# Patient Record
Sex: FEMALE | Race: WHITE | NOT HISPANIC OR LATINO
[De-identification: names, ages, dates, MRNs, and addresses within clinical notes are randomized per-mention and may not be internally consistent; named-entity substitution may affect disease eponyms.]

---

## 2017-03-19 PROBLEM — Z00.00 ENCOUNTER FOR PREVENTIVE HEALTH EXAMINATION: Noted: 2017-03-19

## 2017-03-23 ENCOUNTER — RECORD ABSTRACTING (OUTPATIENT)
Age: 77
End: 2017-03-23

## 2017-03-23 DIAGNOSIS — Z63.4 DISAPPEARANCE AND DEATH OF FAMILY MEMBER: ICD-10-CM

## 2017-03-23 DIAGNOSIS — Z92.89 PERSONAL HISTORY OF OTHER MEDICAL TREATMENT: ICD-10-CM

## 2017-03-23 DIAGNOSIS — Z87.891 PERSONAL HISTORY OF NICOTINE DEPENDENCE: ICD-10-CM

## 2017-03-23 DIAGNOSIS — E06.3 AUTOIMMUNE THYROIDITIS: ICD-10-CM

## 2017-03-23 DIAGNOSIS — Z78.9 OTHER SPECIFIED HEALTH STATUS: ICD-10-CM

## 2017-03-23 DIAGNOSIS — C44.311 BASAL CELL CARCINOMA OF SKIN OF NOSE: ICD-10-CM

## 2017-03-23 DIAGNOSIS — E55.9 VITAMIN D DEFICIENCY, UNSPECIFIED: ICD-10-CM

## 2017-03-23 DIAGNOSIS — Z80.42 FAMILY HISTORY OF MALIGNANT NEOPLASM OF PROSTATE: ICD-10-CM

## 2017-03-23 DIAGNOSIS — Z80.0 FAMILY HISTORY OF MALIGNANT NEOPLASM OF DIGESTIVE ORGANS: ICD-10-CM

## 2017-03-23 DIAGNOSIS — Z80.1 FAMILY HISTORY OF MALIGNANT NEOPLASM OF TRACHEA, BRONCHUS AND LUNG: ICD-10-CM

## 2017-03-23 SDOH — SOCIAL STABILITY - SOCIAL INSECURITY: DISSAPEARANCE AND DEATH OF FAMILY MEMBER: Z63.4

## 2017-04-10 ENCOUNTER — APPOINTMENT (OUTPATIENT)
Dept: INTERNAL MEDICINE | Facility: CLINIC | Age: 77
End: 2017-04-10

## 2017-04-11 ENCOUNTER — OTHER (OUTPATIENT)
Age: 77
End: 2017-04-11

## 2017-05-30 ENCOUNTER — OTHER (OUTPATIENT)
Age: 77
End: 2017-05-30

## 2017-05-30 LAB
CHOLEST SERPL-MCNC: 299
GLUCOSE SERPL-MCNC: 89
HBA1C MFR BLD HPLC: 5.3
HDLC SERPL-MCNC: 93
LDLC SERPL CALC-MCNC: 182

## 2017-06-05 ENCOUNTER — APPOINTMENT (OUTPATIENT)
Dept: INTERNAL MEDICINE | Facility: CLINIC | Age: 77
End: 2017-06-05

## 2017-06-08 ENCOUNTER — APPOINTMENT (OUTPATIENT)
Dept: INTERNAL MEDICINE | Facility: CLINIC | Age: 77
End: 2017-06-08

## 2017-06-08 ENCOUNTER — NON-APPOINTMENT (OUTPATIENT)
Age: 77
End: 2017-06-08

## 2017-06-08 VITALS
TEMPERATURE: 97.9 F | BODY MASS INDEX: 26.81 KG/M2 | WEIGHT: 142 LBS | SYSTOLIC BLOOD PRESSURE: 124 MMHG | HEIGHT: 61 IN | HEART RATE: 64 BPM | DIASTOLIC BLOOD PRESSURE: 60 MMHG | OXYGEN SATURATION: 97 % | RESPIRATION RATE: 16 BRPM

## 2017-06-08 DIAGNOSIS — I70.0 ATHEROSCLEROSIS OF AORTA: ICD-10-CM

## 2017-06-08 DIAGNOSIS — E03.9 HYPOTHYROIDISM, UNSPECIFIED: ICD-10-CM

## 2017-06-08 DIAGNOSIS — E78.00 PURE HYPERCHOLESTEROLEMIA, UNSPECIFIED: ICD-10-CM

## 2017-06-08 DIAGNOSIS — Z87.19 PERSONAL HISTORY OF OTHER DISEASES OF THE DIGESTIVE SYSTEM: ICD-10-CM

## 2017-06-08 DIAGNOSIS — H25.9 UNSPECIFIED AGE-RELATED CATARACT: ICD-10-CM

## 2017-06-08 DIAGNOSIS — Z87.891 PERSONAL HISTORY OF NICOTINE DEPENDENCE: ICD-10-CM

## 2017-06-08 DIAGNOSIS — I65.23 OCCLUSION AND STENOSIS OF BILATERAL CAROTID ARTERIES: ICD-10-CM

## 2017-06-08 DIAGNOSIS — Z85.828 PERSONAL HISTORY OF OTHER MALIGNANT NEOPLASM OF SKIN: ICD-10-CM

## 2017-06-08 DIAGNOSIS — R31.29 OTHER MICROSCOPIC HEMATURIA: ICD-10-CM

## 2017-06-08 RX ORDER — CEPHALEXIN 500 MG/1
500 CAPSULE ORAL
Qty: 20 | Refills: 0 | Status: COMPLETED | COMMUNITY
Start: 2017-02-03

## 2017-06-08 RX ORDER — UBIDECARENONE 200 MG
200 CAPSULE ORAL
Refills: 0 | Status: COMPLETED | COMMUNITY
End: 2017-06-08

## 2017-06-12 LAB
APPEARANCE: CLEAR
BACTERIA UR CULT: NORMAL
BACTERIA: NEGATIVE
BILIRUBIN URINE: NEGATIVE
BLOOD URINE: ABNORMAL
COLOR: YELLOW
GLUCOSE QUALITATIVE U: NORMAL MG/DL
HYALINE CASTS: 1 /LPF
KETONES URINE: NEGATIVE
LEUKOCYTE ESTERASE URINE: NEGATIVE
MICROSCOPIC-UA: NORMAL
NITRITE URINE: NEGATIVE
PH URINE: 7
PROTEIN URINE: NEGATIVE MG/DL
RED BLOOD CELLS URINE: 10 /HPF
SPECIFIC GRAVITY URINE: 1.02
SQUAMOUS EPITHELIAL CELLS: 0 /HPF
UROBILINOGEN URINE: NORMAL MG/DL
WHITE BLOOD CELLS URINE: 0 /HPF

## 2017-07-12 ENCOUNTER — APPOINTMENT (OUTPATIENT)
Dept: INTERNAL MEDICINE | Facility: CLINIC | Age: 77
End: 2017-07-12

## 2017-07-12 VITALS
SYSTOLIC BLOOD PRESSURE: 124 MMHG | OXYGEN SATURATION: 98 % | WEIGHT: 139 LBS | DIASTOLIC BLOOD PRESSURE: 76 MMHG | RESPIRATION RATE: 16 BRPM | HEIGHT: 61 IN | HEART RATE: 80 BPM | BODY MASS INDEX: 26.24 KG/M2

## 2017-07-14 LAB
APPEARANCE: CLEAR
BACTERIA UR CULT: NORMAL
BACTERIA: NEGATIVE
BILIRUBIN URINE: NEGATIVE
BLOOD URINE: ABNORMAL
COLOR: YELLOW
GLUCOSE QUALITATIVE U: NORMAL
KETONES URINE: NEGATIVE
LEUKOCYTE ESTERASE URINE: NEGATIVE
MICROSCOPIC-UA: NORMAL
NITRITE URINE: NEGATIVE
PH URINE: 6.5
PROTEIN URINE: NEGATIVE
RED BLOOD CELLS URINE: 4 /HPF
SPECIFIC GRAVITY URINE: 1.02
SQUAMOUS EPITHELIAL CELLS: 2 /HPF
UROBILINOGEN URINE: NORMAL
WHITE BLOOD CELLS URINE: 3 /HPF

## 2017-08-08 ENCOUNTER — APPOINTMENT (OUTPATIENT)
Dept: INTERNAL MEDICINE | Facility: CLINIC | Age: 77
End: 2017-08-08

## 2017-10-31 ENCOUNTER — APPOINTMENT (OUTPATIENT)
Dept: DERMATOLOGY | Facility: CLINIC | Age: 77
End: 2017-10-31
Payer: MEDICARE

## 2017-10-31 DIAGNOSIS — L60.3 NAIL DYSTROPHY: ICD-10-CM

## 2017-10-31 PROCEDURE — 99203 OFFICE O/P NEW LOW 30 MIN: CPT

## 2017-11-20 ENCOUNTER — RX RENEWAL (OUTPATIENT)
Age: 77
End: 2017-11-20

## 2018-01-03 ENCOUNTER — APPOINTMENT (OUTPATIENT)
Dept: DERMATOLOGY | Facility: CLINIC | Age: 78
End: 2018-01-03

## 2018-01-10 ENCOUNTER — OUTPATIENT (OUTPATIENT)
Dept: OUTPATIENT SERVICES | Facility: HOSPITAL | Age: 78
LOS: 1 days | Discharge: HOME | End: 2018-01-10
Payer: MEDICARE

## 2018-01-10 VITALS
DIASTOLIC BLOOD PRESSURE: 79 MMHG | WEIGHT: 145.06 LBS | RESPIRATION RATE: 14 BRPM | HEIGHT: 60.5 IN | OXYGEN SATURATION: 100 % | TEMPERATURE: 97 F | HEART RATE: 67 BPM | SYSTOLIC BLOOD PRESSURE: 117 MMHG

## 2018-01-10 DIAGNOSIS — Z98.42 CATARACT EXTRACTION STATUS, LEFT EYE: ICD-10-CM

## 2018-01-10 DIAGNOSIS — Z96.651 PRESENCE OF RIGHT ARTIFICIAL KNEE JOINT: Chronic | ICD-10-CM

## 2018-01-10 DIAGNOSIS — E03.9 HYPOTHYROIDISM, UNSPECIFIED: ICD-10-CM

## 2018-01-10 DIAGNOSIS — Z98.890 OTHER SPECIFIED POSTPROCEDURAL STATES: Chronic | ICD-10-CM

## 2018-01-10 DIAGNOSIS — Z01.818 ENCOUNTER FOR OTHER PREPROCEDURAL EXAMINATION: ICD-10-CM

## 2018-01-10 DIAGNOSIS — M17.12 UNILATERAL PRIMARY OSTEOARTHRITIS, LEFT KNEE: ICD-10-CM

## 2018-01-10 DIAGNOSIS — Z87.891 PERSONAL HISTORY OF NICOTINE DEPENDENCE: ICD-10-CM

## 2018-01-10 DIAGNOSIS — Z90.89 ACQUIRED ABSENCE OF OTHER ORGANS: Chronic | ICD-10-CM

## 2018-01-10 DIAGNOSIS — D72.829 ELEVATED WHITE BLOOD CELL COUNT, UNSPECIFIED: ICD-10-CM

## 2018-01-10 DIAGNOSIS — Z98.49 CATARACT EXTRACTION STATUS, UNSPECIFIED EYE: Chronic | ICD-10-CM

## 2018-01-10 DIAGNOSIS — D62 ACUTE POSTHEMORRHAGIC ANEMIA: ICD-10-CM

## 2018-01-10 DIAGNOSIS — Z96.651 PRESENCE OF RIGHT ARTIFICIAL KNEE JOINT: ICD-10-CM

## 2018-01-10 LAB
ANION GAP SERPL CALC-SCNC: 9 MMOL/L — SIGNIFICANT CHANGE UP (ref 5–17)
APPEARANCE UR: CLEAR — SIGNIFICANT CHANGE UP
BASOPHILS # BLD AUTO: 0.1 K/UL — SIGNIFICANT CHANGE UP (ref 0–0.2)
BASOPHILS NFR BLD AUTO: 1.2 % — SIGNIFICANT CHANGE UP (ref 0–2)
BILIRUB UR-MCNC: NEGATIVE — SIGNIFICANT CHANGE UP
BUN SERPL-MCNC: 21 MG/DL — SIGNIFICANT CHANGE UP (ref 7–23)
CALCIUM SERPL-MCNC: 9.1 MG/DL — SIGNIFICANT CHANGE UP (ref 8.5–10.1)
CHLORIDE SERPL-SCNC: 104 MMOL/L — SIGNIFICANT CHANGE UP (ref 96–108)
CO2 SERPL-SCNC: 28 MMOL/L — SIGNIFICANT CHANGE UP (ref 22–31)
COLOR SPEC: YELLOW — SIGNIFICANT CHANGE UP
CREAT SERPL-MCNC: 0.55 MG/DL — SIGNIFICANT CHANGE UP (ref 0.5–1.3)
DIFF PNL FLD: (no result)
EOSINOPHIL # BLD AUTO: 0.2 K/UL — SIGNIFICANT CHANGE UP (ref 0–0.5)
EOSINOPHIL NFR BLD AUTO: 2.1 % — SIGNIFICANT CHANGE UP (ref 0–6)
GLUCOSE SERPL-MCNC: 79 MG/DL — SIGNIFICANT CHANGE UP (ref 70–99)
GLUCOSE UR QL: NEGATIVE MG/DL — SIGNIFICANT CHANGE UP
HCT VFR BLD CALC: 39.7 % — SIGNIFICANT CHANGE UP (ref 34.5–45)
HGB BLD-MCNC: 13.2 G/DL — SIGNIFICANT CHANGE UP (ref 11.5–15.5)
KETONES UR-MCNC: NEGATIVE — SIGNIFICANT CHANGE UP
LEUKOCYTE ESTERASE UR-ACNC: (no result)
LYMPHOCYTES # BLD AUTO: 1.8 K/UL — SIGNIFICANT CHANGE UP (ref 1–3.3)
LYMPHOCYTES # BLD AUTO: 24.9 % — SIGNIFICANT CHANGE UP (ref 13–44)
MCHC RBC-ENTMCNC: 29.2 PG — SIGNIFICANT CHANGE UP (ref 27–34)
MCHC RBC-ENTMCNC: 33.4 GM/DL — SIGNIFICANT CHANGE UP (ref 32–36)
MCV RBC AUTO: 87.6 FL — SIGNIFICANT CHANGE UP (ref 80–100)
MONOCYTES # BLD AUTO: 0.6 K/UL — SIGNIFICANT CHANGE UP (ref 0–0.9)
MONOCYTES NFR BLD AUTO: 7.7 % — SIGNIFICANT CHANGE UP (ref 2–14)
NEUTROPHILS # BLD AUTO: 4.7 K/UL — SIGNIFICANT CHANGE UP (ref 1.8–7.4)
NEUTROPHILS NFR BLD AUTO: 64.1 % — SIGNIFICANT CHANGE UP (ref 43–77)
NITRITE UR-MCNC: NEGATIVE — SIGNIFICANT CHANGE UP
PH UR: 8 — SIGNIFICANT CHANGE UP (ref 5–8)
PLATELET # BLD AUTO: 313 K/UL — SIGNIFICANT CHANGE UP (ref 150–400)
POTASSIUM SERPL-MCNC: 4.3 MMOL/L — SIGNIFICANT CHANGE UP (ref 3.5–5.3)
POTASSIUM SERPL-SCNC: 4.3 MMOL/L — SIGNIFICANT CHANGE UP (ref 3.5–5.3)
PROT UR-MCNC: NEGATIVE MG/DL — SIGNIFICANT CHANGE UP
RBC # BLD: 4.53 M/UL — SIGNIFICANT CHANGE UP (ref 3.8–5.2)
RBC # FLD: 11.8 % — SIGNIFICANT CHANGE UP (ref 10.3–14.5)
SODIUM SERPL-SCNC: 141 MMOL/L — SIGNIFICANT CHANGE UP (ref 135–145)
SP GR SPEC: 1.01 — SIGNIFICANT CHANGE UP (ref 1.01–1.02)
TYPE + AB SCN PNL BLD: SIGNIFICANT CHANGE UP
UROBILINOGEN FLD QL: NEGATIVE MG/DL — SIGNIFICANT CHANGE UP
WBC # BLD: 7.3 K/UL — SIGNIFICANT CHANGE UP (ref 3.8–10.5)
WBC # FLD AUTO: 7.3 K/UL — SIGNIFICANT CHANGE UP (ref 3.8–10.5)

## 2018-01-10 PROCEDURE — 93010 ELECTROCARDIOGRAM REPORT: CPT

## 2018-01-10 NOTE — H&P PST ADULT - PSH
History of total knee replacement, right    S/P cataract surgery  b/l  S/P colonoscopy with polypectomy    S/P tonsillectomy

## 2018-01-10 NOTE — H&P PST ADULT - FAMILY HISTORY
Father  Still living? No  Family history of lung cancer, Age at diagnosis: Age Unknown     Mother  Still living? No  Family history of diabetes mellitus, Age at diagnosis: Age Unknown  Family history of pancreatic cancer, Age at diagnosis: Age Unknown     Sibling  Still living? Yes, Estimated age: 61-70  Family history of prostate cancer, Age at diagnosis: Age Unknown

## 2018-01-10 NOTE — H&P PST ADULT - PMH
Basal cell carcinoma (BCC) in situ of skin  h/o on nose  Hypothyroid    Knee pain, left    Osteoarthritis of knee    Pneumonia Basal cell carcinoma (BCC) in situ of skin  h/o on nose  Hypothyroid    Knee pain, left    Osteoarthritis of knee    Pneumonia  2010?

## 2018-01-10 NOTE — H&P PST ADULT - ASSESSMENT
78 yo female scheduled for left TKR with Dr. De Anda on 1/19/18    1. Labs as per surgeon  2. EKG  3. Medical clearance with PCP Dr Abbott  4. discussed EZ sponges, mupirocin & day of procedure instructions  5. Stat PTT, PT/INR on admit  Caprini score 11 76 yo female scheduled for left TKR with Dr. De Anda on 1/19/18    1. Labs as per surgeon  2. EKG  3. Medical clearance with PCP Dr Abbott  4. discussed EZ sponges, mupirocin & day of procedure instructions  5. Stat PTT, PT/INR on admit  6. Plans on attending joint education class today  Leei score 11

## 2018-01-10 NOTE — H&P PST ADULT - HISTORY OF PRESENT ILLNESS
78 yo female presents to PST prior to proposed procedure. c/o left knee pain x "years" that increases with sitting>standing. Had right TKR in 2016 with Dr De Anda. Had MRI & xrays of left knee. Reports taking advil occasionally prn.  Now for said procedure.

## 2018-01-11 ENCOUNTER — OTHER (OUTPATIENT)
Age: 78
End: 2018-01-11

## 2018-01-11 LAB
MRSA PCR RESULT.: SIGNIFICANT CHANGE UP
S AUREUS DNA NOSE QL NAA+PROBE: SIGNIFICANT CHANGE UP

## 2018-01-17 ENCOUNTER — LABORATORY RESULT (OUTPATIENT)
Age: 78
End: 2018-01-17

## 2018-01-17 ENCOUNTER — APPOINTMENT (OUTPATIENT)
Dept: INTERNAL MEDICINE | Facility: CLINIC | Age: 78
End: 2018-01-17
Payer: MEDICARE

## 2018-01-17 VITALS
SYSTOLIC BLOOD PRESSURE: 122 MMHG | HEIGHT: 61 IN | WEIGHT: 145 LBS | DIASTOLIC BLOOD PRESSURE: 80 MMHG | HEART RATE: 70 BPM | TEMPERATURE: 98.3 F | BODY MASS INDEX: 27.38 KG/M2 | OXYGEN SATURATION: 96 % | RESPIRATION RATE: 16 BRPM

## 2018-01-17 PROCEDURE — 99214 OFFICE O/P EST MOD 30 MIN: CPT | Mod: 25

## 2018-01-17 PROCEDURE — 94010 BREATHING CAPACITY TEST: CPT

## 2018-01-18 RX ORDER — FAMOTIDINE 10 MG/ML
20 INJECTION INTRAVENOUS ONCE
Qty: 0 | Refills: 0 | Status: COMPLETED | OUTPATIENT
Start: 2018-01-19 | End: 2018-01-19

## 2018-01-18 RX ORDER — PANTOPRAZOLE SODIUM 20 MG/1
40 TABLET, DELAYED RELEASE ORAL ONCE
Qty: 0 | Refills: 0 | Status: COMPLETED | OUTPATIENT
Start: 2018-01-19 | End: 2018-01-19

## 2018-01-18 RX ORDER — OXYCODONE HYDROCHLORIDE 5 MG/1
10 TABLET ORAL ONCE
Qty: 0 | Refills: 0 | Status: DISCONTINUED | OUTPATIENT
Start: 2018-01-19 | End: 2018-01-19

## 2018-01-18 RX ORDER — ACETAMINOPHEN 500 MG
975 TABLET ORAL ONCE
Qty: 0 | Refills: 0 | Status: COMPLETED | OUTPATIENT
Start: 2018-01-19 | End: 2018-01-19

## 2018-01-19 ENCOUNTER — INPATIENT (INPATIENT)
Facility: HOSPITAL | Age: 78
LOS: 2 days | Discharge: NURSING, REHAB OR LONG-TERM CARE | End: 2018-01-22
Attending: ORTHOPAEDIC SURGERY | Admitting: ORTHOPAEDIC SURGERY
Payer: MEDICARE

## 2018-01-19 ENCOUNTER — RESULT REVIEW (OUTPATIENT)
Age: 78
End: 2018-01-19

## 2018-01-19 VITALS
TEMPERATURE: 98 F | RESPIRATION RATE: 16 BRPM | HEIGHT: 60.5 IN | OXYGEN SATURATION: 100 % | DIASTOLIC BLOOD PRESSURE: 73 MMHG | SYSTOLIC BLOOD PRESSURE: 150 MMHG | HEART RATE: 77 BPM | WEIGHT: 145.28 LBS

## 2018-01-19 DIAGNOSIS — Z90.89 ACQUIRED ABSENCE OF OTHER ORGANS: Chronic | ICD-10-CM

## 2018-01-19 DIAGNOSIS — Z98.890 OTHER SPECIFIED POSTPROCEDURAL STATES: Chronic | ICD-10-CM

## 2018-01-19 DIAGNOSIS — E03.9 HYPOTHYROIDISM, UNSPECIFIED: ICD-10-CM

## 2018-01-19 DIAGNOSIS — Z98.49 CATARACT EXTRACTION STATUS, UNSPECIFIED EYE: Chronic | ICD-10-CM

## 2018-01-19 DIAGNOSIS — Z96.651 PRESENCE OF RIGHT ARTIFICIAL KNEE JOINT: Chronic | ICD-10-CM

## 2018-01-19 DIAGNOSIS — Z98.890 OTHER SPECIFIED POSTPROCEDURAL STATES: ICD-10-CM

## 2018-01-19 LAB
ANION GAP SERPL CALC-SCNC: 7 MMOL/L — SIGNIFICANT CHANGE UP (ref 5–17)
APTT BLD: 30.3 SEC — SIGNIFICANT CHANGE UP (ref 27.5–37.4)
BUN SERPL-MCNC: 17 MG/DL — SIGNIFICANT CHANGE UP (ref 7–23)
CALCIUM SERPL-MCNC: 8.3 MG/DL — LOW (ref 8.5–10.1)
CHLORIDE SERPL-SCNC: 108 MMOL/L — SIGNIFICANT CHANGE UP (ref 96–108)
CO2 SERPL-SCNC: 27 MMOL/L — SIGNIFICANT CHANGE UP (ref 22–31)
CREAT SERPL-MCNC: 0.68 MG/DL — SIGNIFICANT CHANGE UP (ref 0.5–1.3)
GLUCOSE SERPL-MCNC: 87 MG/DL — SIGNIFICANT CHANGE UP (ref 70–99)
HCT VFR BLD CALC: 36.1 % — SIGNIFICANT CHANGE UP (ref 34.5–45)
HGB BLD-MCNC: 12.1 G/DL — SIGNIFICANT CHANGE UP (ref 11.5–15.5)
INR BLD: 1.01 RATIO — SIGNIFICANT CHANGE UP (ref 0.88–1.16)
INR BLD: 1.06 RATIO — SIGNIFICANT CHANGE UP (ref 0.88–1.16)
MCHC RBC-ENTMCNC: 29.3 PG — SIGNIFICANT CHANGE UP (ref 27–34)
MCHC RBC-ENTMCNC: 33.5 GM/DL — SIGNIFICANT CHANGE UP (ref 32–36)
MCV RBC AUTO: 87.3 FL — SIGNIFICANT CHANGE UP (ref 80–100)
PLATELET # BLD AUTO: 251 K/UL — SIGNIFICANT CHANGE UP (ref 150–400)
POTASSIUM SERPL-MCNC: 4 MMOL/L — SIGNIFICANT CHANGE UP (ref 3.5–5.3)
POTASSIUM SERPL-SCNC: 4 MMOL/L — SIGNIFICANT CHANGE UP (ref 3.5–5.3)
PROTHROM AB SERPL-ACNC: 10.9 SEC — SIGNIFICANT CHANGE UP (ref 9.8–12.7)
PROTHROM AB SERPL-ACNC: 11.5 SEC — SIGNIFICANT CHANGE UP (ref 9.8–12.7)
RBC # BLD: 4.14 M/UL — SIGNIFICANT CHANGE UP (ref 3.8–5.2)
RBC # FLD: 11.8 % — SIGNIFICANT CHANGE UP (ref 10.3–14.5)
SODIUM SERPL-SCNC: 142 MMOL/L — SIGNIFICANT CHANGE UP (ref 135–145)
WBC # BLD: 7 K/UL — SIGNIFICANT CHANGE UP (ref 3.8–10.5)
WBC # FLD AUTO: 7 K/UL — SIGNIFICANT CHANGE UP (ref 3.8–10.5)

## 2018-01-19 PROCEDURE — 73560 X-RAY EXAM OF KNEE 1 OR 2: CPT | Mod: 26,LT

## 2018-01-19 PROCEDURE — 99223 1ST HOSP IP/OBS HIGH 75: CPT

## 2018-01-19 PROCEDURE — 88305 TISSUE EXAM BY PATHOLOGIST: CPT | Mod: 26

## 2018-01-19 RX ORDER — RIVAROXABAN 15 MG-20MG
1 KIT ORAL
Qty: 0 | Refills: 0 | COMMUNITY
Start: 2018-01-19 | End: 2018-01-30

## 2018-01-19 RX ORDER — CELECOXIB 200 MG/1
200 CAPSULE ORAL ONCE
Qty: 0 | Refills: 0 | Status: COMPLETED | OUTPATIENT
Start: 2018-01-19 | End: 2018-01-19

## 2018-01-19 RX ORDER — MAGNESIUM HYDROXIDE 400 MG/1
30 TABLET, CHEWABLE ORAL DAILY
Qty: 0 | Refills: 0 | Status: DISCONTINUED | OUTPATIENT
Start: 2018-01-19 | End: 2018-01-22

## 2018-01-19 RX ORDER — PROCHLORPERAZINE MALEATE 5 MG
10 TABLET ORAL EVERY 6 HOURS
Qty: 0 | Refills: 0 | Status: DISCONTINUED | OUTPATIENT
Start: 2018-01-19 | End: 2018-01-22

## 2018-01-19 RX ORDER — DOCUSATE SODIUM 100 MG
100 CAPSULE ORAL EVERY 12 HOURS
Qty: 0 | Refills: 0 | Status: DISCONTINUED | OUTPATIENT
Start: 2018-01-19 | End: 2018-01-22

## 2018-01-19 RX ORDER — GABAPENTIN 400 MG/1
100 CAPSULE ORAL EVERY 8 HOURS
Qty: 0 | Refills: 0 | Status: DISCONTINUED | OUTPATIENT
Start: 2018-01-19 | End: 2018-01-22

## 2018-01-19 RX ORDER — SENNA PLUS 8.6 MG/1
2 TABLET ORAL AT BEDTIME
Qty: 0 | Refills: 0 | Status: DISCONTINUED | OUTPATIENT
Start: 2018-01-19 | End: 2018-01-22

## 2018-01-19 RX ORDER — OXYCODONE HYDROCHLORIDE 5 MG/1
10 TABLET ORAL EVERY 12 HOURS
Qty: 0 | Refills: 0 | Status: DISCONTINUED | OUTPATIENT
Start: 2018-01-19 | End: 2018-01-19

## 2018-01-19 RX ORDER — HYDROMORPHONE HYDROCHLORIDE 2 MG/ML
0.5 INJECTION INTRAMUSCULAR; INTRAVENOUS; SUBCUTANEOUS
Qty: 0 | Refills: 0 | Status: DISCONTINUED | OUTPATIENT
Start: 2018-01-19 | End: 2018-01-22

## 2018-01-19 RX ORDER — SODIUM CHLORIDE 9 MG/ML
1000 INJECTION, SOLUTION INTRAVENOUS
Qty: 0 | Refills: 0 | Status: DISCONTINUED | OUTPATIENT
Start: 2018-01-19 | End: 2018-01-22

## 2018-01-19 RX ORDER — MEPERIDINE HYDROCHLORIDE 50 MG/ML
12.5 INJECTION INTRAMUSCULAR; INTRAVENOUS; SUBCUTANEOUS
Qty: 0 | Refills: 0 | Status: DISCONTINUED | OUTPATIENT
Start: 2018-01-19 | End: 2018-01-19

## 2018-01-19 RX ORDER — FENTANYL CITRATE 50 UG/ML
50 INJECTION INTRAVENOUS
Qty: 0 | Refills: 0 | Status: DISCONTINUED | OUTPATIENT
Start: 2018-01-19 | End: 2018-01-19

## 2018-01-19 RX ORDER — RIVAROXABAN 15 MG-20MG
10 KIT ORAL DAILY
Qty: 0 | Refills: 0 | Status: DISCONTINUED | OUTPATIENT
Start: 2018-01-19 | End: 2018-01-22

## 2018-01-19 RX ORDER — OXYCODONE HYDROCHLORIDE 5 MG/1
10 TABLET ORAL EVERY 4 HOURS
Qty: 0 | Refills: 0 | Status: DISCONTINUED | OUTPATIENT
Start: 2018-01-19 | End: 2018-01-22

## 2018-01-19 RX ORDER — LEVOTHYROXINE SODIUM 125 MCG
50 TABLET ORAL DAILY
Qty: 0 | Refills: 0 | Status: DISCONTINUED | OUTPATIENT
Start: 2018-01-19 | End: 2018-01-22

## 2018-01-19 RX ORDER — ACETAMINOPHEN 500 MG
650 TABLET ORAL EVERY 6 HOURS
Qty: 0 | Refills: 0 | Status: DISCONTINUED | OUTPATIENT
Start: 2018-01-19 | End: 2018-01-22

## 2018-01-19 RX ORDER — CEFAZOLIN SODIUM 1 G
2000 VIAL (EA) INJECTION EVERY 6 HOURS
Qty: 0 | Refills: 0 | Status: COMPLETED | OUTPATIENT
Start: 2018-01-19 | End: 2018-01-20

## 2018-01-19 RX ORDER — ONDANSETRON 8 MG/1
4 TABLET, FILM COATED ORAL EVERY 6 HOURS
Qty: 0 | Refills: 0 | Status: DISCONTINUED | OUTPATIENT
Start: 2018-01-19 | End: 2018-01-22

## 2018-01-19 RX ORDER — ACETAMINOPHEN 500 MG
1000 TABLET ORAL ONCE
Qty: 0 | Refills: 0 | Status: COMPLETED | OUTPATIENT
Start: 2018-01-19 | End: 2018-01-19

## 2018-01-19 RX ORDER — ONDANSETRON 8 MG/1
4 TABLET, FILM COATED ORAL ONCE
Qty: 0 | Refills: 0 | Status: DISCONTINUED | OUTPATIENT
Start: 2018-01-19 | End: 2018-01-19

## 2018-01-19 RX ORDER — ACETAMINOPHEN 500 MG
975 TABLET ORAL EVERY 6 HOURS
Qty: 0 | Refills: 0 | Status: DISCONTINUED | OUTPATIENT
Start: 2018-01-19 | End: 2018-01-22

## 2018-01-19 RX ORDER — SODIUM CHLORIDE 9 MG/ML
3 INJECTION INTRAMUSCULAR; INTRAVENOUS; SUBCUTANEOUS EVERY 8 HOURS
Qty: 0 | Refills: 0 | Status: DISCONTINUED | OUTPATIENT
Start: 2018-01-19 | End: 2018-01-19

## 2018-01-19 RX ORDER — OXYCODONE HYDROCHLORIDE 5 MG/1
5 TABLET ORAL EVERY 4 HOURS
Qty: 0 | Refills: 0 | Status: DISCONTINUED | OUTPATIENT
Start: 2018-01-19 | End: 2018-01-22

## 2018-01-19 RX ORDER — CELECOXIB 200 MG/1
200 CAPSULE ORAL
Qty: 0 | Refills: 0 | Status: DISCONTINUED | OUTPATIENT
Start: 2018-01-19 | End: 2018-01-22

## 2018-01-19 RX ORDER — SODIUM CHLORIDE 9 MG/ML
1000 INJECTION, SOLUTION INTRAVENOUS
Qty: 0 | Refills: 0 | Status: DISCONTINUED | OUTPATIENT
Start: 2018-01-19 | End: 2018-01-19

## 2018-01-19 RX ORDER — PANTOPRAZOLE SODIUM 20 MG/1
40 TABLET, DELAYED RELEASE ORAL DAILY
Qty: 0 | Refills: 0 | Status: DISCONTINUED | OUTPATIENT
Start: 2018-01-19 | End: 2018-01-22

## 2018-01-19 RX ORDER — DOCUSATE SODIUM 100 MG
100 CAPSULE ORAL THREE TIMES A DAY
Qty: 0 | Refills: 0 | Status: DISCONTINUED | OUTPATIENT
Start: 2018-01-19 | End: 2018-01-22

## 2018-01-19 RX ORDER — POLYETHYLENE GLYCOL 3350 17 G/17G
17 POWDER, FOR SOLUTION ORAL DAILY
Qty: 0 | Refills: 0 | Status: DISCONTINUED | OUTPATIENT
Start: 2018-01-19 | End: 2018-01-22

## 2018-01-19 RX ADMIN — RIVAROXABAN 10 MILLIGRAM(S): KIT at 21:25

## 2018-01-19 RX ADMIN — Medication 100 MILLIGRAM(S): at 21:25

## 2018-01-19 RX ADMIN — FAMOTIDINE 20 MILLIGRAM(S): 10 INJECTION INTRAVENOUS at 11:36

## 2018-01-19 RX ADMIN — SODIUM CHLORIDE 100 MILLILITER(S): 9 INJECTION, SOLUTION INTRAVENOUS at 15:56

## 2018-01-19 RX ADMIN — SODIUM CHLORIDE 75 MILLILITER(S): 9 INJECTION, SOLUTION INTRAVENOUS at 18:34

## 2018-01-19 RX ADMIN — ONDANSETRON 4 MILLIGRAM(S): 8 TABLET, FILM COATED ORAL at 18:10

## 2018-01-19 RX ADMIN — OXYCODONE HYDROCHLORIDE 5 MILLIGRAM(S): 5 TABLET ORAL at 22:45

## 2018-01-19 RX ADMIN — OXYCODONE HYDROCHLORIDE 10 MILLIGRAM(S): 5 TABLET ORAL at 11:37

## 2018-01-19 RX ADMIN — Medication 975 MILLIGRAM(S): at 18:03

## 2018-01-19 RX ADMIN — Medication 400 MILLIGRAM(S): at 15:55

## 2018-01-19 RX ADMIN — GABAPENTIN 100 MILLIGRAM(S): 400 CAPSULE ORAL at 21:25

## 2018-01-19 RX ADMIN — OXYCODONE HYDROCHLORIDE 5 MILLIGRAM(S): 5 TABLET ORAL at 23:41

## 2018-01-19 RX ADMIN — CELECOXIB 200 MILLIGRAM(S): 200 CAPSULE ORAL at 11:36

## 2018-01-19 RX ADMIN — PANTOPRAZOLE SODIUM 40 MILLIGRAM(S): 20 TABLET, DELAYED RELEASE ORAL at 11:37

## 2018-01-19 RX ADMIN — Medication 975 MILLIGRAM(S): at 11:36

## 2018-01-19 RX ADMIN — Medication 100 MILLIGRAM(S): at 18:03

## 2018-01-19 RX ADMIN — OXYCODONE HYDROCHLORIDE 5 MILLIGRAM(S): 5 TABLET ORAL at 16:05

## 2018-01-19 NOTE — BRIEF OPERATIVE NOTE - PROCEDURE
<<-----Click on this checkbox to enter Procedure Total knee arthroplasty  01/19/2018  LEFT  Active  [id number]

## 2018-01-19 NOTE — CONSULT NOTE ADULT - ASSESSMENT
76 Y/O FEMALE WITH THE ABOVE MED HX S/P LEFT TKR.
I/P:  76 yo female S/P left TKR, POD#0, high thrombosis risk due to age, surgery and immobility      Discussed the necessity of VTE prophylaxis with coumadin. pt states she was on Xarelto last time and prefers to use Xarelto again    Plan:  start xarelto 10 mg po tonight and cont x 12 days total------->1/30  Daily CBC/BMP  Enc ambulation  Venodynes    Thank you for this consult, will continue to follow.

## 2018-01-19 NOTE — CONSULT NOTE ADULT - PROBLEM SELECTOR RECOMMENDATION 9
POD# 0  PAIN CONTROL  MONITOR HEMOVAC OUTPUT  INCENTIVE SPIROMETRY  PHYSICAL THERAPY  DVT PROPHY - ON XARELTO

## 2018-01-19 NOTE — PATIENT PROFILE ADULT. - PMH
Basal cell carcinoma (BCC) in situ of skin  h/o on nose  Hypothyroid    Knee pain, left    Osteoarthritis of knee    Pneumonia  2010?

## 2018-01-19 NOTE — CONSULT NOTE ADULT - SUBJECTIVE AND OBJECTIVE BOX
HPI:  78 y/o female with hypothyroidism, OA, s/p left TKR.  Medicine consult requested for post op medical management.    18: No cp,s ob, n/v/f/c; no pain to the left leg currently      PAST MEDICAL & SURGICAL HISTORY:  Pneumonia: 2010?  Basal cell carcinoma (BCC) in situ of skin: h/o on nose  Osteoarthritis of knee  Knee pain, left  Hypothyroid  S/P cataract surgery: b/l  S/P tonsillectomy  History of total knee replacement, right  S/P colonoscopy with polypectomy      FAMILY HISTORY:     Family history of prostate cancer (Sibling)  Family history of pancreatic cancer (Mother)  Family history of diabetes mellitus (Mother)  Family history of lung cancer (Father)      SOCIAL HISTORY:  previous smoking hx, quit in , occas alcohol, no drugs    REVIEW OF SYSTEMS:   All 10 systems reviewed in detailed and found to be negative with the exception of what has already been described above    MEDICATIONS  (STANDING):  acetaminophen   Tablet. 975 milliGRAM(s) Oral every 6 hours  ceFAZolin   IVPB 2000 milliGRAM(s) IV Intermittent every 6 hours  celecoxib 200 milliGRAM(s) Oral with breakfast  docusate sodium 100 milliGRAM(s) Oral every 12 hours  docusate sodium 100 milliGRAM(s) Oral three times a day  gabapentin 100 milliGRAM(s) Oral every 8 hours  lactated ringers. 1000 milliLiter(s) (75 mL/Hr) IV Continuous <Continuous>  levothyroxine 50 MICROGram(s) Oral daily  multivitamin 1 Tablet(s) Oral daily  pantoprazole    Tablet 40 milliGRAM(s) Oral daily  polyethylene glycol 3350 17 Gram(s) Oral daily  rivaroxaban 10 milliGRAM(s) Oral daily    MEDICATIONS  (PRN):  acetaminophen   Tablet 650 milliGRAM(s) Oral every 6 hours PRN For Temp over 38.3 C (100.94 F)  aluminum hydroxide/magnesium hydroxide/simethicone Suspension 30 milliLiter(s) Oral four times a day PRN Indigestion  HYDROmorphone  Injectable 0.5 milliGRAM(s) IV Push every 3 hours PRN Severe Pain (7 - 10)  magnesium hydroxide Suspension 30 milliLiter(s) Oral daily PRN Constipation  ondansetron Injectable 4 milliGRAM(s) IV Push every 6 hours PRN Nausea and/or Vomiting  ondansetron Injectable 4 milliGRAM(s) IV Push every 6 hours PRN Nausea and/or Vomiting  oxyCODONE    IR 5 milliGRAM(s) Oral every 4 hours PRN Mild Pain (1 - 3)  oxyCODONE    IR 10 milliGRAM(s) Oral every 4 hours PRN Moderate Pain (4 - 6)  prochlorperazine   Injectable 10 milliGRAM(s) IV Push every 6 hours PRN Nausea and/or Vomiting  senna 2 Tablet(s) Oral at bedtime PRN Constipation      Allergies    No Known Allergies    Intolerances          PHYSICAL EXAM:    Vital Signs Last 24 Hrs  T(C): 36.4 (2018 18:09), Max: 36.8 (2018 15:31)  T(F): 97.5 (2018 18:09), Max: 98.2 (2018 15:31)  HR: 71 (2018 18:09) (71 - 82)  BP: 125/82 (2018 18:09) (101/57 - 150/73)  BP(mean): --  RR: 17 (2018 18:09) (14 - 21)  SpO2: 100% (2018 18:09) (96% - 100%)    GEN: A and O, NAD,  mood stable  HEENT:   NC/AT, EOMI, no oropharyngeal lesions    NECK:   supple    CV:  +S1, +S2, regular, no murmurs or rubs    RESP:   lungs clear to auscultation bilaterally, no wheezing, rales, rhonchi, good air entry bilaterally    GI:  abdomen soft, non-tender, non-distended, normal BS,  no abdominal masses, no palpable masses    RECTAL:  not examined    :  not examined    MSK:   normal muscle tone, no atrophy, no rigidity, no contractions    EXT:   no clubbing, no cyanosis, LEFT KNEE EDEMA, DRESSING C/D/I WITH HEMOVAC IN PLACE no calf pain, swelling or erythema    VASCULAR:  pulses equal and symmetric in the upper and lower extremities    NEURO:  AAOX3, no focal neurological deficits, follows all commands, able to move extremities spontaneously    SKIN:  no ulcers, lesions or rashes    LABS/IMAGIN.1   7.0   )-----------( 251      ( 2018 16:25 )             36.1     -19    142  |  108  |  17  ----------------------------<  87  4.0   |  27  |  0.68    Ca    8.3<L>      2018 16:25            PT/INR - ( 2018 11:28 )   PT: 10.9 sec;   INR: 1.01 ratio         PTT - ( 2018 11:28 )  PTT:30.3 sec      EKG: SR@81BPM, INC RBBB
HPI:      Patient is a 77y old  Female who presents with a chief complaint of  inc left knee pain, H/O OA, S/P Left total left knee replaced      Consulted by Dr. De Anda   for VTE prophylaxis, risk stratification, and anticoagulation management.    PAST MEDICAL & SURGICAL HISTORY:  Pneumonia: 2010?  Basal cell carcinoma (BCC) in situ of skin: h/o on nose  Osteoarthritis of knee  Knee pain, left  Hypothyroid  S/P cataract surgery: b/l  S/P tonsillectomy  History of total knee replacement, right  S/P colonoscopy with polypectomy          CrCl:    Caprini VTE Risk Score: 8            IMPROVE Bleeding Risk Score #1.5    Falls Risk:   High ( x )  Mod (  )  Low (  )      FAMILY HISTORY:  Family history of prostate cancer (Sibling)  Family history of pancreatic cancer (Mother)  Family history of diabetes mellitus (Mother)  Family history of lung cancer (Father)    Denies any personal or familial history of clotting or bleeding disorders.    Allergies    No Known Allergies    Intolerances        REVIEW OF SYSTEMS    (  )Fever	     (  )Constipation	(  )SOB				(  )Headache	(  )Dysuria  (  )Chills	     (  )Melena	(  )Dyspnea present on exertion	                    (  )Dizziness                    (  )Polyuria  (  )Nausea	     (  )Hematochezia	(  )Cough			                    (  )Syncope   	(  )Hematuria  (  )Vomiting    (  )Chest Pain	(  )Wheezing			(  )Weakness  (  )Diarrhea     (  )Palpitations	(  )Anorexia			(  )Myalgia    All other review of systems negative: Yes          PHYSICAL EXAM:    Constitutional: Appears Well    Neurological: A& O x 3    Skin: Warm    Respiratory and Thorax: normal effort; Breath sounds: normal; No rales/wheezing/rhonchi  	  Cardiovascular: S1, S2, regular, NMBR	    Gastrointestinal: BS + x 4Q, nontender	    Genitourinary:  Bladder nondistended, nontender    Musculoskeletal:   General Right:   no muscle/joint tenderness,   normal tone, no joint swelling,   ROM: full	    General Left:   + muscle/joint tenderness,   normal tone, no joint swelling,   ROM: limited        Knee:               Left: Incision: ; Dressing CDI; Drain: hemovac ; Type of drng.: serosang.; Amt. of drng: small    Lower extrems:   Right: no calf tenderness              negative edenilson's sign               + pedal pulses    Left:   no calf tenderness              negative edenilson's sign               + pedal pulses                PT/INR - ( 19 Jan 2018 11:28 )   PT: 10.9 sec;   INR: 1.01 ratio         PTT - ( 19 Jan 2018 11:28 )  PTT:30.3 sec				    MEDICATIONS  (STANDING):  acetaminophen   Tablet. 975 milliGRAM(s) Oral every 6 hours  celecoxib 200 milliGRAM(s) Oral with breakfast  docusate sodium 100 milliGRAM(s) Oral every 12 hours  lactated ringers. 1000 milliLiter(s) IV Continuous <Continuous>          DVT Prophylaxis:  LMWH                   (  )  Heparin SQ           (  )  Coumadin             (  )  Xarelto                  ( x )  Eliquis                   (  )  Venodynes           (x  )  Ambulation          (  )  UFH                       (  )  Contraindicated  (  )

## 2018-01-19 NOTE — PROCEDURE NOTE - ADDITIONAL PROCEDURE DETAILS
Site markings verified. Sterile prep with chlorhexidine and drape, ultrasound-guided, 20 g/6 in stimuplex needle, good visualization of needle and nerve at all times, Needle directed to the lateral and superior borders of the femoral artery at mid-thigh level. 20cc of 0.5% Ropivacaine with 4 mg decadron injected easily, no heme after aspirating every 5cc, no intraneural injection, no paresthesia.  Good spread of local anesthetic by U/S view. Procedure well tolerated without complications.

## 2018-01-20 DIAGNOSIS — D72.829 ELEVATED WHITE BLOOD CELL COUNT, UNSPECIFIED: ICD-10-CM

## 2018-01-20 DIAGNOSIS — D50.0 IRON DEFICIENCY ANEMIA SECONDARY TO BLOOD LOSS (CHRONIC): ICD-10-CM

## 2018-01-20 LAB
ANION GAP SERPL CALC-SCNC: 8 MMOL/L — SIGNIFICANT CHANGE UP (ref 5–17)
BUN SERPL-MCNC: 16 MG/DL — SIGNIFICANT CHANGE UP (ref 7–23)
CALCIUM SERPL-MCNC: 8.2 MG/DL — LOW (ref 8.5–10.1)
CHLORIDE SERPL-SCNC: 109 MMOL/L — HIGH (ref 96–108)
CO2 SERPL-SCNC: 24 MMOL/L — SIGNIFICANT CHANGE UP (ref 22–31)
CREAT SERPL-MCNC: 0.54 MG/DL — SIGNIFICANT CHANGE UP (ref 0.5–1.3)
GLUCOSE SERPL-MCNC: 110 MG/DL — HIGH (ref 70–99)
HCT VFR BLD CALC: 30.1 % — LOW (ref 34.5–45)
HGB BLD-MCNC: 10.2 G/DL — LOW (ref 11.5–15.5)
INR BLD: 1.24 RATIO — HIGH (ref 0.88–1.16)
MCHC RBC-ENTMCNC: 29.3 PG — SIGNIFICANT CHANGE UP (ref 27–34)
MCHC RBC-ENTMCNC: 33.7 GM/DL — SIGNIFICANT CHANGE UP (ref 32–36)
MCV RBC AUTO: 87 FL — SIGNIFICANT CHANGE UP (ref 80–100)
PLATELET # BLD AUTO: 226 K/UL — SIGNIFICANT CHANGE UP (ref 150–400)
POTASSIUM SERPL-MCNC: 4 MMOL/L — SIGNIFICANT CHANGE UP (ref 3.5–5.3)
POTASSIUM SERPL-SCNC: 4 MMOL/L — SIGNIFICANT CHANGE UP (ref 3.5–5.3)
PROTHROM AB SERPL-ACNC: 13.4 SEC — HIGH (ref 9.8–12.7)
RBC # BLD: 3.46 M/UL — LOW (ref 3.8–5.2)
RBC # FLD: 11.4 % — SIGNIFICANT CHANGE UP (ref 10.3–14.5)
SODIUM SERPL-SCNC: 141 MMOL/L — SIGNIFICANT CHANGE UP (ref 135–145)
WBC # BLD: 12.6 K/UL — HIGH (ref 3.8–10.5)
WBC # FLD AUTO: 12.6 K/UL — HIGH (ref 3.8–10.5)

## 2018-01-20 PROCEDURE — 99233 SBSQ HOSP IP/OBS HIGH 50: CPT

## 2018-01-20 RX ADMIN — PANTOPRAZOLE SODIUM 40 MILLIGRAM(S): 20 TABLET, DELAYED RELEASE ORAL at 11:52

## 2018-01-20 RX ADMIN — POLYETHYLENE GLYCOL 3350 17 GRAM(S): 17 POWDER, FOR SOLUTION ORAL at 11:52

## 2018-01-20 RX ADMIN — Medication 975 MILLIGRAM(S): at 06:30

## 2018-01-20 RX ADMIN — Medication 100 MILLIGRAM(S): at 14:14

## 2018-01-20 RX ADMIN — RIVAROXABAN 10 MILLIGRAM(S): KIT at 11:52

## 2018-01-20 RX ADMIN — Medication 975 MILLIGRAM(S): at 23:29

## 2018-01-20 RX ADMIN — Medication 50 MICROGRAM(S): at 06:30

## 2018-01-20 RX ADMIN — GABAPENTIN 100 MILLIGRAM(S): 400 CAPSULE ORAL at 14:14

## 2018-01-20 RX ADMIN — GABAPENTIN 100 MILLIGRAM(S): 400 CAPSULE ORAL at 06:30

## 2018-01-20 RX ADMIN — Medication 100 MILLIGRAM(S): at 02:21

## 2018-01-20 RX ADMIN — Medication 975 MILLIGRAM(S): at 17:09

## 2018-01-20 RX ADMIN — GABAPENTIN 100 MILLIGRAM(S): 400 CAPSULE ORAL at 22:22

## 2018-01-20 RX ADMIN — Medication 100 MILLIGRAM(S): at 17:11

## 2018-01-20 RX ADMIN — Medication 975 MILLIGRAM(S): at 11:51

## 2018-01-20 RX ADMIN — Medication 100 MILLIGRAM(S): at 22:22

## 2018-01-20 RX ADMIN — Medication 100 MILLIGRAM(S): at 06:30

## 2018-01-20 RX ADMIN — Medication 1 TABLET(S): at 11:52

## 2018-01-20 RX ADMIN — CELECOXIB 200 MILLIGRAM(S): 200 CAPSULE ORAL at 08:03

## 2018-01-20 NOTE — PHYSICAL THERAPY INITIAL EVALUATION ADULT - LEVEL OF INDEPENDENCE: SIT/SUPINE, REHAB EVAL
dependent (less than 25% patients effort)/pt passed out in chair due to low BP, so transferred back to bed

## 2018-01-20 NOTE — PHYSICAL THERAPY INITIAL EVALUATION ADULT - CRITERIA FOR SKILLED THERAPEUTIC INTERVENTIONS
anticipated equipment needs at discharge/rehab potential/predicted duration of therapy intervention/impairments found/therapy frequency/anticipated discharge recommendation

## 2018-01-21 LAB
ANION GAP SERPL CALC-SCNC: 6 MMOL/L — SIGNIFICANT CHANGE UP (ref 5–17)
BUN SERPL-MCNC: 20 MG/DL — SIGNIFICANT CHANGE UP (ref 7–23)
CALCIUM SERPL-MCNC: 8.5 MG/DL — SIGNIFICANT CHANGE UP (ref 8.5–10.1)
CHLORIDE SERPL-SCNC: 108 MMOL/L — SIGNIFICANT CHANGE UP (ref 96–108)
CO2 SERPL-SCNC: 26 MMOL/L — SIGNIFICANT CHANGE UP (ref 22–31)
CREAT SERPL-MCNC: 0.5 MG/DL — SIGNIFICANT CHANGE UP (ref 0.5–1.3)
GLUCOSE SERPL-MCNC: 107 MG/DL — HIGH (ref 70–99)
HCT VFR BLD CALC: 26.2 % — LOW (ref 34.5–45)
HGB BLD-MCNC: 8.9 G/DL — LOW (ref 11.5–15.5)
MCHC RBC-ENTMCNC: 29.6 PG — SIGNIFICANT CHANGE UP (ref 27–34)
MCHC RBC-ENTMCNC: 33.9 GM/DL — SIGNIFICANT CHANGE UP (ref 32–36)
MCV RBC AUTO: 87.2 FL — SIGNIFICANT CHANGE UP (ref 80–100)
PLATELET # BLD AUTO: 187 K/UL — SIGNIFICANT CHANGE UP (ref 150–400)
POTASSIUM SERPL-MCNC: 4 MMOL/L — SIGNIFICANT CHANGE UP (ref 3.5–5.3)
POTASSIUM SERPL-SCNC: 4 MMOL/L — SIGNIFICANT CHANGE UP (ref 3.5–5.3)
RBC # BLD: 3.01 M/UL — LOW (ref 3.8–5.2)
RBC # FLD: 11.8 % — SIGNIFICANT CHANGE UP (ref 10.3–14.5)
SODIUM SERPL-SCNC: 140 MMOL/L — SIGNIFICANT CHANGE UP (ref 135–145)
WBC # BLD: 7.7 K/UL — SIGNIFICANT CHANGE UP (ref 3.8–10.5)
WBC # FLD AUTO: 7.7 K/UL — SIGNIFICANT CHANGE UP (ref 3.8–10.5)

## 2018-01-21 PROCEDURE — 99233 SBSQ HOSP IP/OBS HIGH 50: CPT

## 2018-01-21 RX ADMIN — GABAPENTIN 100 MILLIGRAM(S): 400 CAPSULE ORAL at 13:27

## 2018-01-21 RX ADMIN — Medication 975 MILLIGRAM(S): at 17:19

## 2018-01-21 RX ADMIN — OXYCODONE HYDROCHLORIDE 10 MILLIGRAM(S): 5 TABLET ORAL at 15:25

## 2018-01-21 RX ADMIN — OXYCODONE HYDROCHLORIDE 10 MILLIGRAM(S): 5 TABLET ORAL at 09:25

## 2018-01-21 RX ADMIN — OXYCODONE HYDROCHLORIDE 10 MILLIGRAM(S): 5 TABLET ORAL at 08:45

## 2018-01-21 RX ADMIN — Medication 100 MILLIGRAM(S): at 13:27

## 2018-01-21 RX ADMIN — Medication 50 MICROGRAM(S): at 06:02

## 2018-01-21 RX ADMIN — CELECOXIB 200 MILLIGRAM(S): 200 CAPSULE ORAL at 08:43

## 2018-01-21 RX ADMIN — PANTOPRAZOLE SODIUM 40 MILLIGRAM(S): 20 TABLET, DELAYED RELEASE ORAL at 11:28

## 2018-01-21 RX ADMIN — POLYETHYLENE GLYCOL 3350 17 GRAM(S): 17 POWDER, FOR SOLUTION ORAL at 11:28

## 2018-01-21 RX ADMIN — Medication 975 MILLIGRAM(S): at 06:02

## 2018-01-21 RX ADMIN — Medication 975 MILLIGRAM(S): at 22:01

## 2018-01-21 RX ADMIN — GABAPENTIN 100 MILLIGRAM(S): 400 CAPSULE ORAL at 21:41

## 2018-01-21 RX ADMIN — Medication 1 TABLET(S): at 11:28

## 2018-01-21 RX ADMIN — RIVAROXABAN 10 MILLIGRAM(S): KIT at 11:29

## 2018-01-21 RX ADMIN — Medication 975 MILLIGRAM(S): at 21:43

## 2018-01-21 RX ADMIN — Medication 975 MILLIGRAM(S): at 00:01

## 2018-01-21 RX ADMIN — Medication 975 MILLIGRAM(S): at 11:28

## 2018-01-21 RX ADMIN — Medication 100 MILLIGRAM(S): at 17:20

## 2018-01-21 RX ADMIN — Medication 100 MILLIGRAM(S): at 06:02

## 2018-01-21 RX ADMIN — GABAPENTIN 100 MILLIGRAM(S): 400 CAPSULE ORAL at 06:02

## 2018-01-22 ENCOUNTER — TRANSCRIPTION ENCOUNTER (OUTPATIENT)
Age: 78
End: 2018-01-22

## 2018-01-22 VITALS
DIASTOLIC BLOOD PRESSURE: 67 MMHG | OXYGEN SATURATION: 98 % | SYSTOLIC BLOOD PRESSURE: 118 MMHG | HEART RATE: 79 BPM | TEMPERATURE: 98 F | RESPIRATION RATE: 16 BRPM

## 2018-01-22 LAB
ANION GAP SERPL CALC-SCNC: 6 MMOL/L — SIGNIFICANT CHANGE UP (ref 5–17)
BUN SERPL-MCNC: 10 MG/DL — SIGNIFICANT CHANGE UP (ref 7–23)
CALCIUM SERPL-MCNC: 8.6 MG/DL — SIGNIFICANT CHANGE UP (ref 8.5–10.1)
CHLORIDE SERPL-SCNC: 108 MMOL/L — SIGNIFICANT CHANGE UP (ref 96–108)
CO2 SERPL-SCNC: 27 MMOL/L — SIGNIFICANT CHANGE UP (ref 22–31)
CREAT SERPL-MCNC: 0.42 MG/DL — LOW (ref 0.5–1.3)
GLUCOSE SERPL-MCNC: 97 MG/DL — SIGNIFICANT CHANGE UP (ref 70–99)
HCT VFR BLD CALC: 25.7 % — LOW (ref 34.5–45)
HGB BLD-MCNC: 8.8 G/DL — LOW (ref 11.5–15.5)
MCHC RBC-ENTMCNC: 29.7 PG — SIGNIFICANT CHANGE UP (ref 27–34)
MCHC RBC-ENTMCNC: 34 GM/DL — SIGNIFICANT CHANGE UP (ref 32–36)
MCV RBC AUTO: 87.2 FL — SIGNIFICANT CHANGE UP (ref 80–100)
PLATELET # BLD AUTO: 189 K/UL — SIGNIFICANT CHANGE UP (ref 150–400)
POTASSIUM SERPL-MCNC: 4.2 MMOL/L — SIGNIFICANT CHANGE UP (ref 3.5–5.3)
POTASSIUM SERPL-SCNC: 4.2 MMOL/L — SIGNIFICANT CHANGE UP (ref 3.5–5.3)
RBC # BLD: 2.95 M/UL — LOW (ref 3.8–5.2)
RBC # FLD: 12 % — SIGNIFICANT CHANGE UP (ref 10.3–14.5)
SODIUM SERPL-SCNC: 141 MMOL/L — SIGNIFICANT CHANGE UP (ref 135–145)
WBC # BLD: 8.1 K/UL — SIGNIFICANT CHANGE UP (ref 3.8–10.5)
WBC # FLD AUTO: 8.1 K/UL — SIGNIFICANT CHANGE UP (ref 3.8–10.5)

## 2018-01-22 PROCEDURE — 99233 SBSQ HOSP IP/OBS HIGH 50: CPT

## 2018-01-22 RX ORDER — OXYCODONE HYDROCHLORIDE 5 MG/1
1 TABLET ORAL
Qty: 0 | Refills: 0 | COMMUNITY
Start: 2018-01-22

## 2018-01-22 RX ORDER — IBUPROFEN 200 MG
2 TABLET ORAL
Qty: 0 | Refills: 0 | COMMUNITY

## 2018-01-22 RX ORDER — DOCUSATE SODIUM 100 MG
1 CAPSULE ORAL
Qty: 0 | Refills: 0 | COMMUNITY
Start: 2018-01-22

## 2018-01-22 RX ORDER — PANTOPRAZOLE SODIUM 20 MG/1
1 TABLET, DELAYED RELEASE ORAL
Qty: 0 | Refills: 0 | COMMUNITY
Start: 2018-01-22

## 2018-01-22 RX ADMIN — Medication 975 MILLIGRAM(S): at 16:50

## 2018-01-22 RX ADMIN — CELECOXIB 200 MILLIGRAM(S): 200 CAPSULE ORAL at 09:29

## 2018-01-22 RX ADMIN — PANTOPRAZOLE SODIUM 40 MILLIGRAM(S): 20 TABLET, DELAYED RELEASE ORAL at 12:16

## 2018-01-22 RX ADMIN — OXYCODONE HYDROCHLORIDE 10 MILLIGRAM(S): 5 TABLET ORAL at 12:42

## 2018-01-22 RX ADMIN — GABAPENTIN 100 MILLIGRAM(S): 400 CAPSULE ORAL at 15:24

## 2018-01-22 RX ADMIN — GABAPENTIN 100 MILLIGRAM(S): 400 CAPSULE ORAL at 05:39

## 2018-01-22 RX ADMIN — CELECOXIB 200 MILLIGRAM(S): 200 CAPSULE ORAL at 08:58

## 2018-01-22 RX ADMIN — Medication 975 MILLIGRAM(S): at 12:16

## 2018-01-22 RX ADMIN — Medication 975 MILLIGRAM(S): at 05:38

## 2018-01-22 RX ADMIN — RIVAROXABAN 10 MILLIGRAM(S): KIT at 12:16

## 2018-01-22 RX ADMIN — OXYCODONE HYDROCHLORIDE 10 MILLIGRAM(S): 5 TABLET ORAL at 08:59

## 2018-01-22 RX ADMIN — Medication 975 MILLIGRAM(S): at 06:32

## 2018-01-22 RX ADMIN — Medication 1 TABLET(S): at 12:16

## 2018-01-22 RX ADMIN — OXYCODONE HYDROCHLORIDE 10 MILLIGRAM(S): 5 TABLET ORAL at 09:29

## 2018-01-22 RX ADMIN — Medication 50 MICROGRAM(S): at 05:39

## 2018-01-22 NOTE — PROGRESS NOTE ADULT - PROBLEM SELECTOR PLAN 4
H/H STABLE  DUE TO ACUTE BLOOD LOSS  WILL MONITOR
H/H STABLE  DUE TO ACUTE BLOOD LOSS
H/H STABLE  DUE TO ACUTE BLOOD LOSS  WILL MONITOR

## 2018-01-22 NOTE — DISCHARGE NOTE ADULT - PLAN OF CARE
Return to baseline ADLs Discharge Instructions for Total Knee Arthroplasty    1.  Diet: Resume previous diet  2. Activity: WBAT, Rolling walker, Daily PT. Gentle ROM 0-full as tolerated. Walk plenty.  Wear immobilizer only while asleep x 3 weeks.   3. Call with: fever over 101, wound redness, drainage or open area, calf pain/calf swelling  4. Wound Care: Remove old and place new Aquacel  bandage to Knee every 7 days.   5. RN to Remove Staples Post Op Day #14 (2/2/18) so long as wound is healed, no drainage or open area. OK to Shower with Aquacel; Must be and Aquacel bandage to shower.  Avoid direct water beating on bandage.  Continue ICE packs to knee.  6. DVT PE Prophylaxis: Managed by Anticoag Team. See Anticoagulation Instructions. See Med Rec.  7. Continue Protonix daily while on Anticoagulant. an eRx has been sent to your pharmacy.  8. Labs: Check H&H weekly while on Anticoagulation.   9. Follow Up: Dr. De Anda 1 month;  Call to schedule.  10. Pain medications:  If going home, eRX sent to your pharmacy for . Discharge Instructions for Total Knee Arthroplasty    1.  Diet: Resume previous diet  2. Activity: WBAT, Rolling walker, Daily PT. Gentle ROM 0-full as tolerated. Walk plenty.  Wear immobilizer only while asleep x 3 weeks.   3. Call with: fever over 101, wound redness, drainage or open area, calf pain/calf swelling  4. Wound Care: Remove old and place new Aquacel  bandage to Knee every 7 days.   5. RN to Remove Staples Post Op Day #14 (2/2/18) so long as wound is healed, no drainage or open area. OK to Shower with Aquacel; Must be and Aquacel bandage to shower.  Avoid direct water beating on bandage.  Continue ICE packs to knee.  6. DVT PE Prophylaxis: Managed by Anticoag Team. See Anticoagulation Instructions. See Med Rec.  7. Continue Protonix daily while on Anticoagulant. an eRx has been sent to your pharmacy.  8. Labs: Check H&H weekly while on Anticoagulation.   9. Follow Up: Dr. De Anda 1 month;  Call to schedule.  10. Pain medications:  see med rec

## 2018-01-22 NOTE — PROGRESS NOTE ADULT - PROBLEM SELECTOR PLAN 1
POD# 1  PAIN CONTROL  INCENTIVE SPIROMETRY  PHYSICAL THERAPY  DVT PROPHY - ON XARELTO.
POD# 3  PAIN CONTROL  INCENTIVE SPIROMETRY  PHYSICAL THERAPY  DVT PROPHY - ON XARELTO.
POD# 2  PAIN CONTROL  INCENTIVE SPIROMETRY  PHYSICAL THERAPY  DVT PROPHY - ON XARELTO.

## 2018-01-22 NOTE — PROGRESS NOTE ADULT - SUBJECTIVE AND OBJECTIVE BOX
HPI:  Patient is a 77y old  Female who presents with a chief complaint of  inc left knee pain, H/O OA, S/P Left total left knee replaced  Consulted by Dr. De Anda   for VTE prophylaxis, risk stratification, and anticoagulation management.    PAST MEDICAL & SURGICAL HISTORY:  Pneumonia: 2010?  Basal cell carcinoma (BCC) in situ of skin: h/o on nose  Osteoarthritis of knee  Knee pain, left  Hypothyroid  S/P cataract surgery: b/l  S/P tonsillectomy  History of total knee replacement, right  S/P colonoscopy with polypectomy    CrCl: 101.29    Caprini VTE Risk Score: 8  IMPROVE Bleeding Risk Score #1.5    Falls Risk:   High ( x )  Mod (  )  Low (  )  1-22-18 Pt seen at bedside on 2N.  Discussed her anticoagulation with Xarelto  No concerns.  States she is gong to rehab today at [facility].      FAMILY HISTORY:  Family history of prostate cancer (Sibling)  Family history of pancreatic cancer (Mother)  Family history of diabetes mellitus (Mother)  Family history of lung cancer (Father)    Denies any personal or familial history of clotting or bleeding disorders.    Allergies    No Known Allergies    Intolerances    REVIEW OF SYSTEMS    (  )Fever	     (  )Constipation	(  )SOB				(  )Headache	(  )Dysuria  (  )Chills	     (  )Melena	(  )Dyspnea present on exertion	                    (  )Dizziness                    (  )Polyuria  (  )Nausea	     (  )Hematochezia	(  )Cough			                    (  )Syncope   	(  )Hematuria  (  )Vomiting    (  )Chest Pain	(  )Wheezing			(  )Weakness  (  )Diarrhea     (  )Palpitations	(  )Anorexia			(  )Myalgia    All other review of systems negative: Yes          PHYSICAL EXAM:    Constitutional: Appears Well    Neurological: A& O x 3    Skin: Warm    Respiratory and Thorax: normal effort; Breath sounds: normal; No rales/wheezing/rhonchi  	  Cardiovascular: S1, S2, regular, NMBR	    Gastrointestinal: BS + x 4Q, nontender	    Genitourinary:  Bladder nondistended, nontender    Musculoskeletal:   General Right:   no muscle/joint tenderness,   normal tone, no joint swelling,   ROM: full	    General Left:   + muscle/joint tenderness,   normal tone, no joint swelling,   ROM: limited        Knee:   Left: Incision: ; Dressing CDI;     Lower extrems:   Right: no calf tenderness              negative edenilson's sign               + pedal pulses    Left:   no calf tenderness              negative edenilson's sign               + pedal pulses                          8.8    8.1   )-----------( 189      ( 22 Jan 2018 06:16 )             25.7       01-22    141  |  108  |  10  ----------------------------<  97  4.2   |  27  |  0.42<L>    Ca    8.6      22 Jan 2018 06:16        	                        8.9    7.7   )-----------( 187      ( 21 Jan 2018 05:48 )             26.2       01-21    140  |  108  |  20  ----------------------------<  107<H>  4.0   |  26  |  0.50    Ca    8.5      21 Jan 2018 05:48        PT/INR - ( 20 Jan 2018 05:37 )   PT: 13.4 sec;   INR: 1.24 ratio         PTT - ( 19 Jan 2018 11:28 )  PTT:30.3 sec                        10.2   12.6  )-----------( 226      ( 20 Jan 2018 05:37 )             30.1       01-20    141  |  109<H>  |  16  ----------------------------<  110<H>  4.0   |  24  |  0.54    Ca    8.2<L>      20 Jan 2018 05:37        PT/INR - ( 20 Jan 2018 05:37 )   PT: 13.4 sec;   INR: 1.24 ratio         PTT - ( 19 Jan 2018 11:28 )  PTT:30.3 sec        PT/INR - ( 19 Jan 2018 11:28 )   PT: 10.9 sec;   INR: 1.01 ratio         PTT - ( 19 Jan 2018 11:28 )  PTT:30.3 sec				    MEDICATIONS  (STANDING):  acetaminophen   Tablet. 975 milliGRAM(s) Oral every 6 hours  celecoxib 200 milliGRAM(s) Oral with breakfast  docusate sodium 100 milliGRAM(s) Oral every 12 hours  docusate sodium 100 milliGRAM(s) Oral three times a day  gabapentin 100 milliGRAM(s) Oral every 8 hours  lactated ringers. 1000 milliLiter(s) IV Continuous <Continuous>  levothyroxine 50 MICROGram(s) Oral daily  multivitamin 1 Tablet(s) Oral daily  pantoprazole    Tablet 40 milliGRAM(s) Oral daily  polyethylene glycol 3350 17 Gram(s) Oral daily  rivaroxaban 10 milliGRAM(s) Oral daily    Vital Signs Last 24 Hrs  T(C): 36.4 (01-22-18 @ 11:58), Max: 36.9 (01-21-18 @ 19:53)  T(F): 97.6 (01-22-18 @ 11:58), Max: 98.4 (01-21-18 @ 19:53)  HR: 76 (01-22-18 @ 11:58) (76 - 96)  BP: 113/72 (01-22-18 @ 11:58) (95/56 - 139/55)  BP(mean): --  RR: 16 (01-22-18 @ 11:58) (16 - 17)  SpO2: 99% (01-22-18 @ 11:58) (99% - 100%)    DVT Prophylaxis:  LMWH                   (  )  Heparin SQ           (  )  Coumadin             (  )  Xarelto                  ( x )  Eliquis                   (  )  Venodynes           (x  )  Ambulation          (  )  UFH                       (  )  Contraindicated  (  )
Patient seen and examined. Pain controlled. No acute events overnight    HEALTH ISSUES - PROBLEM Dx:  Anemia due to blood loss: Anemia due to blood loss  Elevated WBC count: Elevated WBC count  Hypothyroid: Hypothyroid  Postprocedural state: Postprocedural state        MEDICATIONS  (STANDING):  acetaminophen   Tablet. 975 milliGRAM(s) Oral every 6 hours  celecoxib 200 milliGRAM(s) Oral with breakfast  docusate sodium 100 milliGRAM(s) Oral every 12 hours  docusate sodium 100 milliGRAM(s) Oral three times a day  gabapentin 100 milliGRAM(s) Oral every 8 hours  lactated ringers. 1000 milliLiter(s) IV Continuous <Continuous>  levothyroxine 50 MICROGram(s) Oral daily  multivitamin 1 Tablet(s) Oral daily  pantoprazole    Tablet 40 milliGRAM(s) Oral daily  polyethylene glycol 3350 17 Gram(s) Oral daily  rivaroxaban 10 milliGRAM(s) Oral daily    Allergies    No Known Allergies    Intolerances                            8.9    7.7   )-----------( 187      ( 21 Jan 2018 05:48 )             26.2     22 Jan 2018 06:16    141    |  108    |  10     ----------------------------<  97     4.2     |  27     |  0.42     Ca    8.6        22 Jan 2018 06:16        Vital Signs Last 24 Hrs  T(C): 36.9 (01-21-18 @ 19:53), Max: 36.9 (01-21-18 @ 11:52)  T(F): 98.4 (01-21-18 @ 19:53), Max: 98.5 (01-21-18 @ 11:52)  HR: 82 (01-21-18 @ 19:53) (74 - 96)  BP: 95/56 (01-21-18 @ 19:53) (95/56 - 139/55)  BP(mean): --  RR: 16 (01-21-18 @ 19:53) (16 - 17)  SpO2: 100% (01-21-18 @ 19:53) (98% - 100%)    Physical Exam  Gen: NAD  LLE:   Dressing c/d/i  +ehl/fhl/ta/gs function  L2-S1 silt  Dp/pt pulse intact  No calf ttp  Compartments soft    A/P: 77y Female sp L TKA POD3  Pain control  DVT ppx  PT/WBAT/OOB  FU labs  Ice/elevate  Medical management appreciated  Incentive spirometry  Dispo planning
Pt S/E at bedside, no acute events overnight, pain controlled    Vital Signs Last 24 Hrs  T(C): 37.4 (21 Jan 2018 05:04), Max: 37.4 (21 Jan 2018 05:04)  T(F): 99.4 (21 Jan 2018 05:04), Max: 99.4 (21 Jan 2018 05:04)  HR: 88 (21 Jan 2018 05:04) (70 - 88)  BP: 107/52 (21 Jan 2018 05:04) (93/48 - 110/50)  BP(mean): --  RR: 16 (21 Jan 2018 05:04) (16 - 16)  SpO2: 98% (21 Jan 2018 05:04) (98% - 100%)    Gen: NAD    Left Lower Extremity:  Dressing clean dry intact  +EHL/FHL/TA/GS  SILT L3-S1  +DP/PT Pulses  Compartments soft  No calf TTP B/L
HPI:      Patient is a 77y old  Female who presents with a chief complaint of  inc left knee pain, H/O OA, S/P Left total left knee replaced      Consulted by Dr. De Anda   for VTE prophylaxis, risk stratification, and anticoagulation management.    PAST MEDICAL & SURGICAL HISTORY:  Pneumonia: 2010?  Basal cell carcinoma (BCC) in situ of skin: h/o on nose  Osteoarthritis of knee  Knee pain, left  Hypothyroid  S/P cataract surgery: b/l  S/P tonsillectomy  History of total knee replacement, right  S/P colonoscopy with polypectomy          CrCl:    Caprini VTE Risk Score: 8            IMPROVE Bleeding Risk Score #1.5    Falls Risk:   High ( x )  Mod (  )  Low (  )      FAMILY HISTORY:  Family history of prostate cancer (Sibling)  Family history of pancreatic cancer (Mother)  Family history of diabetes mellitus (Mother)  Family history of lung cancer (Father)    Denies any personal or familial history of clotting or bleeding disorders.    Allergies    No Known Allergies    Intolerances        REVIEW OF SYSTEMS    (  )Fever	     (  )Constipation	(  )SOB				(  )Headache	(  )Dysuria  (  )Chills	     (  )Melena	(  )Dyspnea present on exertion	                    (  )Dizziness                    (  )Polyuria  (  )Nausea	     (  )Hematochezia	(  )Cough			                    (  )Syncope   	(  )Hematuria  (  )Vomiting    (  )Chest Pain	(  )Wheezing			(  )Weakness  (  )Diarrhea     (  )Palpitations	(  )Anorexia			(  )Myalgia    All other review of systems negative: Yes          PHYSICAL EXAM:    Constitutional: Appears Well    Neurological: A& O x 3    Skin: Warm    Respiratory and Thorax: normal effort; Breath sounds: normal; No rales/wheezing/rhonchi  	  Cardiovascular: S1, S2, regular, NMBR	    Gastrointestinal: BS + x 4Q, nontender	    Genitourinary:  Bladder nondistended, nontender    Musculoskeletal:   General Right:   no muscle/joint tenderness,   normal tone, no joint swelling,   ROM: full	    General Left:   + muscle/joint tenderness,   normal tone, no joint swelling,   ROM: limited        Knee:               Left: Incision: ; Dressing CDI;     Lower extrems:   Right: no calf tenderness              negative edenilson's sign               + pedal pulses    Left:   no calf tenderness              negative edenilson's sign               + pedal pulses                              10.2   12.6  )-----------( 226      ( 20 Jan 2018 05:37 )             30.1       01-20    141  |  109<H>  |  16  ----------------------------<  110<H>  4.0   |  24  |  0.54    Ca    8.2<L>      20 Jan 2018 05:37        PT/INR - ( 20 Jan 2018 05:37 )   PT: 13.4 sec;   INR: 1.24 ratio         PTT - ( 19 Jan 2018 11:28 )  PTT:30.3 sec        PT/INR - ( 19 Jan 2018 11:28 )   PT: 10.9 sec;   INR: 1.01 ratio         PTT - ( 19 Jan 2018 11:28 )  PTT:30.3 sec				    MEDICATIONS  (STANDING):  acetaminophen   Tablet. 975 milliGRAM(s) Oral every 6 hours  celecoxib 200 milliGRAM(s) Oral with breakfast  docusate sodium 100 milliGRAM(s) Oral every 12 hours  docusate sodium 100 milliGRAM(s) Oral three times a day  gabapentin 100 milliGRAM(s) Oral every 8 hours  lactated ringers. 1000 milliLiter(s) IV Continuous <Continuous>  levothyroxine 50 MICROGram(s) Oral daily  multivitamin 1 Tablet(s) Oral daily  pantoprazole    Tablet 40 milliGRAM(s) Oral daily  polyethylene glycol 3350 17 Gram(s) Oral daily  rivaroxaban 10 milliGRAM(s) Oral daily      Vital Signs Last 24 Hrs  T(C): 36.8 (01-20-18 @ 11:50), Max: 36.8 (01-19-18 @ 15:31)  T(F): 98.2 (01-20-18 @ 11:50), Max: 98.3 (01-20-18 @ 03:48)  HR: 70 (01-20-18 @ 11:50) (66 - 82)  BP: 95/45 (01-20-18 @ 11:50) (94/39 - 125/82)  BP(mean): --  RR: 16 (01-20-18 @ 11:50) (14 - 21)  SpO2: 98% (01-20-18 @ 11:50) (95% - 100%)    DVT Prophylaxis:  LMWH                   (  )  Heparin SQ           (  )  Coumadin             (  )  Xarelto                  ( x )  Eliquis                   (  )  Venodynes           (x  )  Ambulation          (  )  UFH                       (  )  Contraindicated  (  )
HPI:      Patient is a 77y old  Female who presents with a chief complaint of  inc left knee pain, H/O OA, S/P Left total left knee replaced      Consulted by Dr. De Anda   for VTE prophylaxis, risk stratification, and anticoagulation management.    PAST MEDICAL & SURGICAL HISTORY:  Pneumonia: 2010?  Basal cell carcinoma (BCC) in situ of skin: h/o on nose  Osteoarthritis of knee  Knee pain, left  Hypothyroid  S/P cataract surgery: b/l  S/P tonsillectomy  History of total knee replacement, right  S/P colonoscopy with polypectomy          CrCl: 101.29    Caprini VTE Risk Score: 8            IMPROVE Bleeding Risk Score #1.5    Falls Risk:   High ( x )  Mod (  )  Low (  )      FAMILY HISTORY:  Family history of prostate cancer (Sibling)  Family history of pancreatic cancer (Mother)  Family history of diabetes mellitus (Mother)  Family history of lung cancer (Father)    Denies any personal or familial history of clotting or bleeding disorders.    Allergies    No Known Allergies    Intolerances        REVIEW OF SYSTEMS    (  )Fever	     (  )Constipation	(  )SOB				(  )Headache	(  )Dysuria  (  )Chills	     (  )Melena	(  )Dyspnea present on exertion	                    (  )Dizziness                    (  )Polyuria  (  )Nausea	     (  )Hematochezia	(  )Cough			                    (  )Syncope   	(  )Hematuria  (  )Vomiting    (  )Chest Pain	(  )Wheezing			(  )Weakness  (  )Diarrhea     (  )Palpitations	(  )Anorexia			(  )Myalgia    All other review of systems negative: Yes          PHYSICAL EXAM:    Constitutional: Appears Well    Neurological: A& O x 3    Skin: Warm    Respiratory and Thorax: normal effort; Breath sounds: normal; No rales/wheezing/rhonchi  	  Cardiovascular: S1, S2, regular, NMBR	    Gastrointestinal: BS + x 4Q, nontender	    Genitourinary:  Bladder nondistended, nontender    Musculoskeletal:   General Right:   no muscle/joint tenderness,   normal tone, no joint swelling,   ROM: full	    General Left:   + muscle/joint tenderness,   normal tone, no joint swelling,   ROM: limited        Knee:               Left: Incision: ; Dressing CDI;     Lower extrems:   Right: no calf tenderness              negative edenilson's sign               + pedal pulses    Left:   no calf tenderness              negative edenilson's sign               + pedal pulses                            8.9    7.7   )-----------( 187      ( 21 Jan 2018 05:48 )             26.2       01-21    140  |  108  |  20  ----------------------------<  107<H>  4.0   |  26  |  0.50    Ca    8.5      21 Jan 2018 05:48        PT/INR - ( 20 Jan 2018 05:37 )   PT: 13.4 sec;   INR: 1.24 ratio         PTT - ( 19 Jan 2018 11:28 )  PTT:30.3 sec                        10.2   12.6  )-----------( 226      ( 20 Jan 2018 05:37 )             30.1       01-20    141  |  109<H>  |  16  ----------------------------<  110<H>  4.0   |  24  |  0.54    Ca    8.2<L>      20 Jan 2018 05:37        PT/INR - ( 20 Jan 2018 05:37 )   PT: 13.4 sec;   INR: 1.24 ratio         PTT - ( 19 Jan 2018 11:28 )  PTT:30.3 sec        PT/INR - ( 19 Jan 2018 11:28 )   PT: 10.9 sec;   INR: 1.01 ratio         PTT - ( 19 Jan 2018 11:28 )  PTT:30.3 sec				    MEDICATIONS  (STANDING):  acetaminophen   Tablet. 975 milliGRAM(s) Oral every 6 hours  celecoxib 200 milliGRAM(s) Oral with breakfast  docusate sodium 100 milliGRAM(s) Oral every 12 hours  docusate sodium 100 milliGRAM(s) Oral three times a day  gabapentin 100 milliGRAM(s) Oral every 8 hours  lactated ringers. 1000 milliLiter(s) IV Continuous <Continuous>  levothyroxine 50 MICROGram(s) Oral daily  multivitamin 1 Tablet(s) Oral daily  pantoprazole    Tablet 40 milliGRAM(s) Oral daily  polyethylene glycol 3350 17 Gram(s) Oral daily  rivaroxaban 10 milliGRAM(s) Oral daily      Vital Signs Last 24 Hrs  T(C): 37.4 (01-21-18 @ 05:04), Max: 37.4 (01-21-18 @ 05:04)  T(F): 99.4 (01-21-18 @ 05:04), Max: 99.4 (01-21-18 @ 05:04)  HR: 88 (01-21-18 @ 05:04) (70 - 88)  BP: 107/52 (01-21-18 @ 05:04) (93/48 - 110/50)  BP(mean): --  RR: 16 (01-21-18 @ 05:04) (16 - 16)  SpO2: 98% (01-21-18 @ 05:04) (98% - 100%)      DVT Prophylaxis:  LMWH                   (  )  Heparin SQ           (  )  Coumadin             (  )  Xarelto                  ( x )  Eliquis                   (  )  Venodynes           (x  )  Ambulation          (  )  UFH                       (  )  Contraindicated  (  )
HPI:  76 y/o female with hypothyroidism, OA, s/p left TKR.  Medicine consult requested for post op medical management.    1/19/18: No cp,s ob, n/v/f/c; no pain to the left leg currently  1/20/18: No cp, sob, n/v/f/c; some left knee pain but well controlled; voiding fine      REVIEW OF SYSTEMS:   All 10 systems reviewed in detailed and found to be negative with the exception of what has already been described above    PHYSICAL EXAM:    Vital Signs Last 24 Hrs  T(C): 36.8 (20 Jan 2018 03:48), Max: 36.8 (19 Jan 2018 15:31)  T(F): 98.3 (20 Jan 2018 03:48), Max: 98.3 (20 Jan 2018 03:48)  HR: 77 (20 Jan 2018 06:27) (66 - 82)  BP: 103/54 (20 Jan 2018 06:27) (94/39 - 150/73)  BP(mean): --  RR: 16 (20 Jan 2018 06:27) (14 - 21)  SpO2: 98% (20 Jan 2018 06:27) (95% - 100%)    GEN: A and O, NAD,  mood stable  HEENT:   NC/AT, EOMI, no oropharyngeal lesions    NECK:   supple    CV:  +S1, +S2, regular, no murmurs or rubs    RESP:   lungs clear to auscultation bilaterally, no wheezing, rales, rhonchi, good air entry bilaterally    GI:  abdomen soft, non-tender, non-distended, normal BS,  no abdominal masses, no palpable masses    RECTAL:  not examined    :  not examined    MSK:   normal muscle tone, no atrophy, no rigidity, no contractions    EXT:   no clubbing, no cyanosis, LEFT KNEE EDEMA, DRESSING C/D/I  no calf pain, swelling or erythema    VASCULAR:  pulses equal and symmetric in the upper and lower extremities    NEURO:  AAOX3, no focal neurological deficits, follows all commands, able to move extremities spontaneously    SKIN:  no ulcers, lesions or rashes    LABS/IMAGING:                              10.2   12.6  )-----------( 226      ( 20 Jan 2018 05:37 )             30.1     01-20    141  |  109<H>  |  16  ----------------------------<  110<H>  4.0   |  24  |  0.54    Ca    8.2<L>      20 Jan 2018 05:37            PT/INR - ( 20 Jan 2018 05:37 )   PT: 13.4 sec;   INR: 1.24 ratio         PTT - ( 19 Jan 2018 11:28 )  PTT:30.3 sec        MEDICATIONS  (STANDING):  acetaminophen   Tablet. 975 milliGRAM(s) Oral every 6 hours  celecoxib 200 milliGRAM(s) Oral with breakfast  docusate sodium 100 milliGRAM(s) Oral every 12 hours  docusate sodium 100 milliGRAM(s) Oral three times a day  gabapentin 100 milliGRAM(s) Oral every 8 hours  lactated ringers. 1000 milliLiter(s) (75 mL/Hr) IV Continuous <Continuous>  levothyroxine 50 MICROGram(s) Oral daily  multivitamin 1 Tablet(s) Oral daily  pantoprazole    Tablet 40 milliGRAM(s) Oral daily  polyethylene glycol 3350 17 Gram(s) Oral daily  rivaroxaban 10 milliGRAM(s) Oral daily    MEDICATIONS  (PRN):  acetaminophen   Tablet 650 milliGRAM(s) Oral every 6 hours PRN For Temp over 38.3 C (100.94 F)  aluminum hydroxide/magnesium hydroxide/simethicone Suspension 30 milliLiter(s) Oral four times a day PRN Indigestion  HYDROmorphone  Injectable 0.5 milliGRAM(s) IV Push every 3 hours PRN Severe Pain (7 - 10)  magnesium hydroxide Suspension 30 milliLiter(s) Oral daily PRN Constipation  ondansetron Injectable 4 milliGRAM(s) IV Push every 6 hours PRN Nausea and/or Vomiting  ondansetron Injectable 4 milliGRAM(s) IV Push every 6 hours PRN Nausea and/or Vomiting  oxyCODONE    IR 5 milliGRAM(s) Oral every 4 hours PRN Mild Pain (1 - 3)  oxyCODONE    IR 10 milliGRAM(s) Oral every 4 hours PRN Moderate Pain (4 - 6)  prochlorperazine   Injectable 10 milliGRAM(s) IV Push every 6 hours PRN Nausea and/or Vomiting  senna 2 Tablet(s) Oral at bedtime PRN Constipation
HPI:  76 y/o female with hypothyroidism, OA, s/p left TKR.  Medicine consult requested for post op medical management.    18: No cp,s ob, n/v/f/c; no pain to the left leg currently  18: No cp, sob, n/v/f/c; some left knee pain but well controlled; voiding fine  18: No cp, sob, n/v/f/c; ambulating; voiding fine; knee pain well controlled  18: no cp, sob, n/v/f/c; left knee pain well controlled    REVIEW OF SYSTEMS:   All 10 systems reviewed in detailed and found to be negative with the exception of what has already been described above    PHYSICAL EXAM:    Vital Signs Last 24 Hrs  T(C): 36.9 (2018 19:53), Max: 36.9 (2018 11:52)  T(F): 98.4 (2018 19:53), Max: 98.5 (2018 11:52)  HR: 82 (2018 19:53) (74 - 96)  BP: 95/56 (2018 19:53) (95/56 - 139/55)  BP(mean): --  RR: 16 (2018 19:53) (16 - 17)  SpO2: 100% (2018 19:53) (98% - 100%)    GEN: A and O, NAD,  mood stable  HEENT:   NC/AT, EOMI, no oropharyngeal lesions    NECK:   supple    CV:  +S1, +S2, regular, no murmurs or rubs    RESP:   lungs clear to auscultation bilaterally, no wheezing, rales, rhonchi, good air entry bilaterally    GI:  abdomen soft, non-tender, non-distended, normal BS,  no abdominal masses, no palpable masses    RECTAL:  not examined    :  not examined    MSK:   normal muscle tone, no atrophy, no rigidity, no contractions    EXT:   no clubbing, no cyanosis, LEFT KNEE EDEMA, DRESSING C/D/I  no calf pain, swelling or erythema    VASCULAR:  pulses equal and symmetric in the upper and lower extremities    NEURO:  AAOX3, no focal neurological deficits, follows all commands, able to move extremities spontaneously    SKIN:  no ulcers, lesions or rashes    LABS/IMAGIN.8    8.1   )-----------( 189      ( 2018 06:16 )             25.7     -    141  |  108  |  10  ----------------------------<  97  4.2   |  27  |  0.42<L>    Ca    8.6      2018 06:16          MEDICATIONS  (STANDING):  acetaminophen   Tablet. 975 milliGRAM(s) Oral every 6 hours  celecoxib 200 milliGRAM(s) Oral with breakfast  docusate sodium 100 milliGRAM(s) Oral every 12 hours  docusate sodium 100 milliGRAM(s) Oral three times a day  gabapentin 100 milliGRAM(s) Oral every 8 hours  lactated ringers. 1000 milliLiter(s) (75 mL/Hr) IV Continuous <Continuous>  levothyroxine 50 MICROGram(s) Oral daily  multivitamin 1 Tablet(s) Oral daily  pantoprazole    Tablet 40 milliGRAM(s) Oral daily  polyethylene glycol 3350 17 Gram(s) Oral daily  rivaroxaban 10 milliGRAM(s) Oral daily    MEDICATIONS  (PRN):  acetaminophen   Tablet 650 milliGRAM(s) Oral every 6 hours PRN For Temp over 38.3 C (100.94 F)  aluminum hydroxide/magnesium hydroxide/simethicone Suspension 30 milliLiter(s) Oral four times a day PRN Indigestion  bisacodyl Suppository 10 milliGRAM(s) Rectal daily PRN If no bowel movement by postoperative day #2  HYDROmorphone  Injectable 0.5 milliGRAM(s) IV Push every 3 hours PRN Severe Pain (7 - 10)  magnesium hydroxide Suspension 30 milliLiter(s) Oral daily PRN Constipation  ondansetron Injectable 4 milliGRAM(s) IV Push every 6 hours PRN Nausea and/or Vomiting  ondansetron Injectable 4 milliGRAM(s) IV Push every 6 hours PRN Nausea and/or Vomiting  oxyCODONE    IR 5 milliGRAM(s) Oral every 4 hours PRN Mild Pain (1 - 3)  oxyCODONE    IR 10 milliGRAM(s) Oral every 4 hours PRN Moderate Pain (4 - 6)  prochlorperazine   Injectable 10 milliGRAM(s) IV Push every 6 hours PRN Nausea and/or Vomiting  senna 2 Tablet(s) Oral at bedtime PRN Constipation
Orthopedic Postop Check    Pt S/E at bedside, tolerated procedure well, pain controlled    Vital Signs Last 24 Hrs  T(C): 36.4 (19 Jan 2018 18:09), Max: 36.8 (19 Jan 2018 15:31)  T(F): 97.5 (19 Jan 2018 18:09), Max: 98.2 (19 Jan 2018 15:31)  HR: 71 (19 Jan 2018 18:09) (71 - 82)  BP: 125/82 (19 Jan 2018 18:09) (101/57 - 150/73)  BP(mean): --  RR: 17 (19 Jan 2018 18:09) (14 - 21)  SpO2: 100% (19 Jan 2018 18:09) (96% - 100%)    Gen: NAD, AAOx3    Left Lower Extremity:  Dressing clean dry intact, HMV in place  +EHL/FHL/TA/GS  SILT L3-S1  +DP/PT Pulses  Compartments soft  No calf TTP B/L
Pt S/E at bedside, no acute events overnight, pain controlled    Vital Signs Last 24 Hrs  T(C): 36.8 (20 Jan 2018 03:48), Max: 36.8 (19 Jan 2018 15:31)  T(F): 98.3 (20 Jan 2018 03:48), Max: 98.3 (20 Jan 2018 03:48)  HR: 77 (20 Jan 2018 06:27) (66 - 82)  BP: 103/54 (20 Jan 2018 06:27) (94/39 - 150/73)  BP(mean): --  RR: 16 (20 Jan 2018 06:27) (14 - 21)  SpO2: 98% (20 Jan 2018 06:27) (95% - 100%)    Gen: NAD, AAOx3    Left Lower Extremity:  Dressing clean dry intact, +HMV  +EHL/FHL/TA/GS  SILT L3-S1  +DP/PT Pulses  Compartments soft  No calf TTP B/L
HPI:  78 y/o female with hypothyroidism, OA, s/p left TKR.  Medicine consult requested for post op medical management.    18: No cp,s ob, n/v/f/c; no pain to the left leg currently  18: No cp, sob, n/v/f/c; some left knee pain but well controlled; voiding fine  18: No cp, sob, n/v/f/c; ambulating; voiding fine; knee pain well controlled    REVIEW OF SYSTEMS:   All 10 systems reviewed in detailed and found to be negative with the exception of what has already been described above    PHYSICAL EXAM:    Vital Signs Last 24 Hrs  T(C): 37.4 (2018 05:04), Max: 37.4 (2018 05:04)  T(F): 99.4 (2018 05:04), Max: 99.4 (2018 05:04)  HR: 88 (2018 05:04) (70 - 88)  BP: 107/52 (2018 05:04) (93/48 - 110/50)  BP(mean): --  RR: 16 (2018 05:04) (16 - 16)  SpO2: 98% (2018 05:04) (98% - 100%)    GEN: A and O, NAD,  mood stable  HEENT:   NC/AT, EOMI, no oropharyngeal lesions    NECK:   supple    CV:  +S1, +S2, regular, no murmurs or rubs    RESP:   lungs clear to auscultation bilaterally, no wheezing, rales, rhonchi, good air entry bilaterally    GI:  abdomen soft, non-tender, non-distended, normal BS,  no abdominal masses, no palpable masses    RECTAL:  not examined    :  not examined    MSK:   normal muscle tone, no atrophy, no rigidity, no contractions    EXT:   no clubbing, no cyanosis, LEFT KNEE EDEMA, DRESSING C/D/I  no calf pain, swelling or erythema    VASCULAR:  pulses equal and symmetric in the upper and lower extremities    NEURO:  AAOX3, no focal neurological deficits, follows all commands, able to move extremities spontaneously    SKIN:  no ulcers, lesions or rashes    LABS/IMAGIN.9    7.7   )-----------( 187      ( 2018 05:48 )             26.2     -    140  |  108  |  20  ----------------------------<  107<H>  4.0   |  26  |  0.50    Ca    8.5      2018 05:48            PT/INR - ( 2018 05:37 )   PT: 13.4 sec;   INR: 1.24 ratio         PTT - ( 2018 11:28 )  PTT:30.3 sec      MEDICATIONS  (STANDING):  acetaminophen   Tablet. 975 milliGRAM(s) Oral every 6 hours  celecoxib 200 milliGRAM(s) Oral with breakfast  docusate sodium 100 milliGRAM(s) Oral every 12 hours  docusate sodium 100 milliGRAM(s) Oral three times a day  gabapentin 100 milliGRAM(s) Oral every 8 hours  lactated ringers. 1000 milliLiter(s) (75 mL/Hr) IV Continuous <Continuous>  levothyroxine 50 MICROGram(s) Oral daily  multivitamin 1 Tablet(s) Oral daily  pantoprazole    Tablet 40 milliGRAM(s) Oral daily  polyethylene glycol 3350 17 Gram(s) Oral daily  rivaroxaban 10 milliGRAM(s) Oral daily    MEDICATIONS  (PRN):  acetaminophen   Tablet 650 milliGRAM(s) Oral every 6 hours PRN For Temp over 38.3 C (100.94 F)  aluminum hydroxide/magnesium hydroxide/simethicone Suspension 30 milliLiter(s) Oral four times a day PRN Indigestion  bisacodyl Suppository 10 milliGRAM(s) Rectal daily PRN If no bowel movement by postoperative day #2  HYDROmorphone  Injectable 0.5 milliGRAM(s) IV Push every 3 hours PRN Severe Pain (7 - 10)  magnesium hydroxide Suspension 30 milliLiter(s) Oral daily PRN Constipation  ondansetron Injectable 4 milliGRAM(s) IV Push every 6 hours PRN Nausea and/or Vomiting  ondansetron Injectable 4 milliGRAM(s) IV Push every 6 hours PRN Nausea and/or Vomiting  oxyCODONE    IR 5 milliGRAM(s) Oral every 4 hours PRN Mild Pain (1 - 3)  oxyCODONE    IR 10 milliGRAM(s) Oral every 4 hours PRN Moderate Pain (4 - 6)  prochlorperazine   Injectable 10 milliGRAM(s) IV Push every 6 hours PRN Nausea and/or Vomiting  senna 2 Tablet(s) Oral at bedtime PRN Constipation

## 2018-01-22 NOTE — DISCHARGE NOTE ADULT - CARE PLAN
Principal Discharge DX:	Status post left knee replacement  Goal:	Return to baseline ADLs  Assessment and plan of treatment:	Discharge Instructions for Total Knee Arthroplasty    1.  Diet: Resume previous diet  2. Activity: WBAT, Rolling walker, Daily PT. Gentle ROM 0-full as tolerated. Walk plenty.  Wear immobilizer only while asleep x 3 weeks.   3. Call with: fever over 101, wound redness, drainage or open area, calf pain/calf swelling  4. Wound Care: Remove old and place new Aquacel  bandage to Knee every 7 days.   5. RN to Remove Staples Post Op Day #14 (2/2/18) so long as wound is healed, no drainage or open area. OK to Shower with Aquacel; Must be and Aquacel bandage to shower.  Avoid direct water beating on bandage.  Continue ICE packs to knee.  6. DVT PE Prophylaxis: Managed by Anticoag Team. See Anticoagulation Instructions. See Med Rec.  7. Continue Protonix daily while on Anticoagulant. an eRx has been sent to your pharmacy.  8. Labs: Check H&H weekly while on Anticoagulation.   9. Follow Up: Dr. De Anda 1 month;  Call to schedule.  10. Pain medications:  If going home, eRX sent to your pharmacy for . Principal Discharge DX:	Status post left knee replacement  Goal:	Return to baseline ADLs  Assessment and plan of treatment:	Discharge Instructions for Total Knee Arthroplasty    1.  Diet: Resume previous diet  2. Activity: WBAT, Rolling walker, Daily PT. Gentle ROM 0-full as tolerated. Walk plenty.  Wear immobilizer only while asleep x 3 weeks.   3. Call with: fever over 101, wound redness, drainage or open area, calf pain/calf swelling  4. Wound Care: Remove old and place new Aquacel  bandage to Knee every 7 days.   5. RN to Remove Staples Post Op Day #14 (2/2/18) so long as wound is healed, no drainage or open area. OK to Shower with Aquacel; Must be and Aquacel bandage to shower.  Avoid direct water beating on bandage.  Continue ICE packs to knee.  6. DVT PE Prophylaxis: Managed by Anticoag Team. See Anticoagulation Instructions. See Med Rec.  7. Continue Protonix daily while on Anticoagulant. an eRx has been sent to your pharmacy.  8. Labs: Check H&H weekly while on Anticoagulation.   9. Follow Up: Dr. De Anda 1 month;  Call to schedule.  10. Pain medications:  see med rec

## 2018-01-22 NOTE — PROGRESS NOTE ADULT - ASSESSMENT
A/P: 77F s/p L TKA POD 2  Analgesia  DVT ppx per AC team  WBAT  PT  DC planning - DAVID when bed available
I/P:  78 yo female S/P left TKR, POD#1, high thrombosis risk due to age, surgery and immobility      Discussed the necessity of VTE prophylaxis with coumadin. pt states she was on Xarelto last time and prefers to use Xarelto again    Plan:  cont xarelto 10 mg po  x 12 days total------->1/30  plans on going to rehab  Daily CBC/BMP  Enc ambulation  Venodynes  will cont to f/u
76 Y/O FEMALE WITH THE ABOVE MED HX S/P LEFT TKR.
78 Y/O FEMALE WITH THE ABOVE MED HX S/P LEFT TKR.
A/P: 77F s/p L TKA POD 0  Analgesia  DVT ppx - per AC team  WBAT  PT  DC planning
A/P: 77F s/p L TKA POD 1  Analgesia  DVT ppx  WBAT  PT  FU Labs  DC planning
I/P:  76 yo female S/P left TKR, POD#1, high thrombosis risk due to age, surgery and immobility      Discussed the necessity of VTE prophylaxis with coumadin. pt states she was on Xarelto last time and prefers to use Xarelto again    Plan:  cont xarelto 10 mg po  x 12 days total------->1/30  plans on going to rehab  Daily CBC/BMP  Enc ambulation  Venodynes
I/P:  76 yo female S/P left TKR, POD#1, high thrombosis risk due to age, surgery and immobility      Discussed the necessity of VTE prophylaxis with coumadin. pt states she was on Xarelto last time and prefers to use Xarelto again    Plan:  cont xarelto 10 mg po  x 12 days total------->1/30  plans on going to rehab  Daily CBC/BMP  Enc ambulation  Venodynes
78 Y/O FEMALE WITH THE ABOVE MED HX S/P LEFT TKR.

## 2018-01-22 NOTE — DISCHARGE NOTE ADULT - MEDICATION SUMMARY - MEDICATIONS TO STOP TAKING
I will STOP taking the medications listed below when I get home from the hospital:    Advil 200 mg oral tablet  -- 2 tab(s) by mouth once a day, As Needed

## 2018-01-22 NOTE — PROGRESS NOTE ADULT - PROBLEM SELECTOR PLAN 3
stress response post op  afebrile  now resolved
now resolved
stress response post op  MONITOR FOR FEVERS

## 2018-01-22 NOTE — PROGRESS NOTE ADULT - PROVIDER SPECIALTY LIST ADULT
Anticoag Management
Anticoag Management
Internal Medicine
Internal Medicine
Orthopedics
Anticoag Management
Orthopedics
Internal Medicine

## 2018-01-22 NOTE — DISCHARGE NOTE ADULT - CARE PROVIDER_API CALL
Paul De Anda (MD), Orthopaedic Surgery  [address] [place] [address]  Phone: [phone]  Fax: [phone]

## 2018-01-22 NOTE — DISCHARGE NOTE ADULT - PATIENT PORTAL LINK FT
“You can access the [employer] Patient Portal, offered by [facility], by registering with the following website: [email or web address]”

## 2018-01-22 NOTE — DISCHARGE NOTE ADULT - MEDICATION SUMMARY - MEDICATIONS TO TAKE
I will START or STAY ON the medications listed below when I get home from the hospital:    oxyCODONE 5 mg oral tablet  -- 1 tab(s) by mouth every 4 hours, As needed, Mild Pain (1 - 3)  -- Indication: For mild pain    oxyCODONE 10 mg oral tablet  -- 1 tab(s) by mouth every 4 hours, As needed, Moderate Pain (4 - 6)  -- Indication: For moderate pain    Xarelto 10 mg oral tablet  -- 1 tab(s) by mouth once a day  -- Indication: For dvt prophylaxis    docusate sodium 100 mg oral capsule  -- 1 cap(s) by mouth 3 times a day  -- Indication: For constipation    pantoprazole 40 mg oral delayed release tablet  -- 1 tab(s) by mouth once a day  -- Indication: For gi protection    Synthroid 50 mcg (0.05 mg) oral tablet  -- 1 tab(s) by mouth once a day  -- Indication: For Home med    Daily Multi oral tablet  -- 1 tab(s) by mouth once a day  -- Indication: For Home med

## 2018-01-22 NOTE — DISCHARGE NOTE ADULT - HOSPITAL COURSE
H&P:  Pt is a77y Female PAST MEDICAL & SURGICAL HISTORY:  Pneumonia: 2010?  Basal cell carcinoma (BCC) in situ of skin: h/o on nose  Osteoarthritis of knee  Knee pain, left  Hypothyroid  S/P cataract surgery: b/l  S/P tonsillectomy  History of total knee replacement, right  S/P colonoscopy with polypectomy       Now s/p Total Knee Arthroplasty. Pt is afebrile with stable vital signs. Pain is controlled. Alert and Oriented. Exam reveals intact EHL FHL TA GS, +DP. Dressing is clean and dry with a New Aquacel bandage on.    Vital Signs Last 24 Hrs  T(C): 36.9 (01-21-18 @ 19:53), Max: 36.9 (01-21-18 @ 11:52)  T(F): 98.4 (01-21-18 @ 19:53), Max: 98.5 (01-21-18 @ 11:52)  HR: 82 (01-21-18 @ 19:53) (74 - 96)  BP: 95/56 (01-21-18 @ 19:53) (95/56 - 139/55)  BP(mean): --  RR: 16 (01-21-18 @ 19:53) (16 - 17)  SpO2: 100% (01-21-18 @ 19:53) (98% - 100%)                        8.8    8.1   )-----------( 189      ( 22 Jan 2018 06:16 )             25.7         Hospital Course:  Patient presented to [facility] medically cleared for elective Knee Replacement Surgery, having failed outpatient conservative management. Prophylactic antibiotics were started before the procedure and continued for 24 hours. They were admitted after surgery to the orthopedic floor.   There were no complications during the hospital stay. All home medications were continued.     Routine consults were obtained from the Anticoagulation Team for DVT/PE prophylaxis, from Physical Therapy for twice daily PT, and from the Hospitalist for Medical Co-management. Patient was placed on Xarelto for anticoagulation.  Pertinent home medications were continued.  Daily labs were followed.      On POD 0 pt was stable overnight. POD1 the hemovac drain was removed. Pt received twice daily PT and a new Aquacel dressing was applied prior to discharge. The plan is for DC to Rehab for ongoing PT.  The orthopedic Attending is aware and agrees. H&P:  Pt is a77y Female PAST MEDICAL & SURGICAL HISTORY:  Pneumonia: 2010?  Basal cell carcinoma (BCC) in situ of skin: h/o on nose  Osteoarthritis of knee  Knee pain, left  Hypothyroid  S/P cataract surgery: b/l  S/P tonsillectomy  History of total knee replacement, right  S/P colonoscopy with polypectomy       Now s/p LEFT Total Knee Arthroplasty. Pt is afebrile with stable vital signs. Pain is controlled. Alert and Oriented. Exam reveals intact EHL FHL TA GS, +DP. Dressing is clean and dry with a New Aquacel bandage on.    Vital Signs Last 24 Hrs  T(C): 36.9 (21 Jan 2018 19:53), Max: 36.9 (21 Jan 2018 11:52)  T(F): 98.4 (21 Jan 2018 19:53), Max: 98.5 (21 Jan 2018 11:52)  HR: 82 (21 Jan 2018 19:53) (74 - 96)  BP: 95/56 (21 Jan 2018 19:53) (95/56 - 139/55)  BP(mean): --  RR: 16 (21 Jan 2018 19:53) (16 - 17)  SpO2: 100% (21 Jan 2018 19:53) (98% - 100%)                          8.8    8.1   )-----------( 189      ( 22 Jan 2018 06:16 )             25.7       Hospital Course:  Patient presented to [facility] medically cleared for elective Knee Replacement Surgery, having failed outpatient conservative management. Prophylactic antibiotics were started before the procedure and continued for 24 hours. They were admitted after surgery to the orthopedic floor.   There were no complications during the hospital stay. All home medications were continued.     Routine consults were obtained from the Anticoagulation Team for DVT/PE prophylaxis, from Physical Therapy for twice daily PT, and from the Hospitalist for Medical Co-management. Patient was placed on Xarelto for anticoagulation.  Pertinent home medications were continued.  Daily labs were followed.      On POD 0 pt was stable overnight. POD1 the hemovac drain was removed. Pt received twice daily PT and a new Aquacel dressing was applied prior to discharge. The plan is for DC to Rehab for ongoing PT.  The orthopedic Attending is aware and agrees.

## 2018-01-22 NOTE — DISCHARGE NOTE ADULT - INSTRUCTIONS
no alcohol with pain medication  encourage fluids daily Call MD for any increase in pain, numbness, tingling; fever over 101F; swelling, redness, oozing at incision site; pain in calf.

## 2018-01-24 LAB
GLUCOSE SERPL-MCNC: 79
SURGICAL PATHOLOGY FINAL REPORT - CH: SIGNIFICANT CHANGE UP

## 2018-01-27 DIAGNOSIS — M17.12 UNILATERAL PRIMARY OSTEOARTHRITIS, LEFT KNEE: ICD-10-CM

## 2018-01-27 DIAGNOSIS — Z96.651 PRESENCE OF RIGHT ARTIFICIAL KNEE JOINT: ICD-10-CM

## 2018-01-27 DIAGNOSIS — D72.829 ELEVATED WHITE BLOOD CELL COUNT, UNSPECIFIED: ICD-10-CM

## 2018-01-27 DIAGNOSIS — Z87.891 PERSONAL HISTORY OF NICOTINE DEPENDENCE: ICD-10-CM

## 2018-01-27 DIAGNOSIS — Z85.828 PERSONAL HISTORY OF OTHER MALIGNANT NEOPLASM OF SKIN: ICD-10-CM

## 2018-01-27 DIAGNOSIS — E03.9 HYPOTHYROIDISM, UNSPECIFIED: ICD-10-CM

## 2018-01-27 DIAGNOSIS — Z98.41 CATARACT EXTRACTION STATUS, RIGHT EYE: ICD-10-CM

## 2018-01-27 DIAGNOSIS — Z98.42 CATARACT EXTRACTION STATUS, LEFT EYE: ICD-10-CM

## 2018-01-27 DIAGNOSIS — D62 ACUTE POSTHEMORRHAGIC ANEMIA: ICD-10-CM

## 2018-05-10 ENCOUNTER — OUTPATIENT (OUTPATIENT)
Dept: OUTPATIENT SERVICES | Facility: HOSPITAL | Age: 78
LOS: 1 days | Discharge: HOME | End: 2018-05-10

## 2018-05-10 VITALS
WEIGHT: 147.93 LBS | HEIGHT: 61 IN | OXYGEN SATURATION: 100 % | DIASTOLIC BLOOD PRESSURE: 93 MMHG | RESPIRATION RATE: 20 BRPM | TEMPERATURE: 98 F | SYSTOLIC BLOOD PRESSURE: 147 MMHG | HEART RATE: 70 BPM

## 2018-05-10 DIAGNOSIS — Z98.49 CATARACT EXTRACTION STATUS, UNSPECIFIED EYE: Chronic | ICD-10-CM

## 2018-05-10 DIAGNOSIS — Z96.652 PRESENCE OF LEFT ARTIFICIAL KNEE JOINT: Chronic | ICD-10-CM

## 2018-05-10 DIAGNOSIS — M25.662 STIFFNESS OF LEFT KNEE, NOT ELSEWHERE CLASSIFIED: ICD-10-CM

## 2018-05-10 DIAGNOSIS — Z96.651 PRESENCE OF RIGHT ARTIFICIAL KNEE JOINT: Chronic | ICD-10-CM

## 2018-05-10 DIAGNOSIS — Z90.89 ACQUIRED ABSENCE OF OTHER ORGANS: Chronic | ICD-10-CM

## 2018-05-10 DIAGNOSIS — Z98.890 OTHER SPECIFIED POSTPROCEDURAL STATES: Chronic | ICD-10-CM

## 2018-05-10 LAB
ANION GAP SERPL CALC-SCNC: 8 MMOL/L — SIGNIFICANT CHANGE UP (ref 5–17)
APPEARANCE UR: CLEAR — SIGNIFICANT CHANGE UP
BACTERIA # UR AUTO: (no result)
BASOPHILS # BLD AUTO: 0.06 K/UL — SIGNIFICANT CHANGE UP (ref 0–0.2)
BASOPHILS NFR BLD AUTO: 0.9 % — SIGNIFICANT CHANGE UP (ref 0–2)
BILIRUB UR-MCNC: NEGATIVE — SIGNIFICANT CHANGE UP
BUN SERPL-MCNC: 12 MG/DL — SIGNIFICANT CHANGE UP (ref 7–23)
CALCIUM SERPL-MCNC: 9.1 MG/DL — SIGNIFICANT CHANGE UP (ref 8.5–10.1)
CHLORIDE SERPL-SCNC: 106 MMOL/L — SIGNIFICANT CHANGE UP (ref 96–108)
CO2 SERPL-SCNC: 30 MMOL/L — SIGNIFICANT CHANGE UP (ref 22–31)
COLOR SPEC: YELLOW — SIGNIFICANT CHANGE UP
CREAT SERPL-MCNC: 0.64 MG/DL — SIGNIFICANT CHANGE UP (ref 0.5–1.3)
DIFF PNL FLD: (no result)
EOSINOPHIL # BLD AUTO: 0.14 K/UL — SIGNIFICANT CHANGE UP (ref 0–0.5)
EOSINOPHIL NFR BLD AUTO: 2.1 % — SIGNIFICANT CHANGE UP (ref 0–6)
EPI CELLS # UR: SIGNIFICANT CHANGE UP
GLUCOSE SERPL-MCNC: 94 MG/DL — SIGNIFICANT CHANGE UP (ref 70–99)
GLUCOSE UR QL: NEGATIVE MG/DL — SIGNIFICANT CHANGE UP
HCT VFR BLD CALC: 39.5 % — SIGNIFICANT CHANGE UP (ref 34.5–45)
HGB BLD-MCNC: 13.1 G/DL — SIGNIFICANT CHANGE UP (ref 11.5–15.5)
IMM GRANULOCYTES NFR BLD AUTO: 0.3 % — SIGNIFICANT CHANGE UP (ref 0–1.5)
KETONES UR-MCNC: NEGATIVE — SIGNIFICANT CHANGE UP
LEUKOCYTE ESTERASE UR-ACNC: (no result)
LYMPHOCYTES # BLD AUTO: 1.87 K/UL — SIGNIFICANT CHANGE UP (ref 1–3.3)
LYMPHOCYTES # BLD AUTO: 27.5 % — SIGNIFICANT CHANGE UP (ref 13–44)
MCHC RBC-ENTMCNC: 28.5 PG — SIGNIFICANT CHANGE UP (ref 27–34)
MCHC RBC-ENTMCNC: 33.2 GM/DL — SIGNIFICANT CHANGE UP (ref 32–36)
MCV RBC AUTO: 86.1 FL — SIGNIFICANT CHANGE UP (ref 80–100)
MONOCYTES # BLD AUTO: 0.59 K/UL — SIGNIFICANT CHANGE UP (ref 0–0.9)
MONOCYTES NFR BLD AUTO: 8.7 % — SIGNIFICANT CHANGE UP (ref 2–14)
NEUTROPHILS # BLD AUTO: 4.11 K/UL — SIGNIFICANT CHANGE UP (ref 1.8–7.4)
NEUTROPHILS NFR BLD AUTO: 60.5 % — SIGNIFICANT CHANGE UP (ref 43–77)
NITRITE UR-MCNC: NEGATIVE — SIGNIFICANT CHANGE UP
NRBC # BLD: 0 /100 WBCS — SIGNIFICANT CHANGE UP (ref 0–0)
PH UR: 7 — SIGNIFICANT CHANGE UP (ref 5–8)
PLATELET # BLD AUTO: 293 K/UL — SIGNIFICANT CHANGE UP (ref 150–400)
POTASSIUM SERPL-MCNC: 4.5 MMOL/L — SIGNIFICANT CHANGE UP (ref 3.5–5.3)
POTASSIUM SERPL-SCNC: 4.5 MMOL/L — SIGNIFICANT CHANGE UP (ref 3.5–5.3)
PROT UR-MCNC: 15 MG/DL
RBC # BLD: 4.59 M/UL — SIGNIFICANT CHANGE UP (ref 3.8–5.2)
RBC # FLD: 13.2 % — SIGNIFICANT CHANGE UP (ref 10.3–14.5)
RBC CASTS # UR COMP ASSIST: (no result) /HPF (ref 0–4)
SODIUM SERPL-SCNC: 144 MMOL/L — SIGNIFICANT CHANGE UP (ref 135–145)
SP GR SPEC: 1.01 — SIGNIFICANT CHANGE UP (ref 1.01–1.02)
UROBILINOGEN FLD QL: NEGATIVE MG/DL — SIGNIFICANT CHANGE UP
WBC # BLD: 6.79 K/UL — SIGNIFICANT CHANGE UP (ref 3.8–10.5)
WBC # FLD AUTO: 6.79 K/UL — SIGNIFICANT CHANGE UP (ref 3.8–10.5)
WBC UR QL: (no result)

## 2018-05-10 NOTE — H&P PST ADULT - PMH
Basal cell carcinoma (BCC) in situ of skin  h/o on nose  Hypothyroid    Knee pain, left    Osteoarthritis of knee    Pneumonia  2010?  Stiffness in joint  left knee

## 2018-05-10 NOTE — H&P PST ADULT - ASSESSMENT
77 y/o female with left knee stiffness. S/P left TKR 01/2018.  Scheduled for manipulation of left knee with Dr. De Anda.    Plan  1. Stop all NSAIDS, herbal supplements and vitamins for 7 days.  2. NPO at midnight.  3. Take the following medications (synthroid) with small sips of water on the morning of your procedure/surgery.  4. Use EZ sponges as directed

## 2018-05-10 NOTE — H&P PST ADULT - PSH
H/O total knee replacement, left  01/19/2018  History of total knee replacement, right    S/P cataract surgery  b/l  S/P colonoscopy with polypectomy    S/P tonsillectomy

## 2018-05-10 NOTE — H&P PST ADULT - HISTORY OF PRESENT ILLNESS
77 y/o female with complain of left knee pain and limited ROM. S/P left TKR in 01/19/2018. Scheduled for manipulation of left knee.

## 2018-05-15 ENCOUNTER — APPOINTMENT (OUTPATIENT)
Dept: INTERNAL MEDICINE | Facility: CLINIC | Age: 78
End: 2018-05-15
Payer: MEDICARE

## 2018-05-15 ENCOUNTER — NON-APPOINTMENT (OUTPATIENT)
Age: 78
End: 2018-05-15

## 2018-05-15 VITALS
SYSTOLIC BLOOD PRESSURE: 128 MMHG | BODY MASS INDEX: 27.56 KG/M2 | OXYGEN SATURATION: 99 % | HEIGHT: 61 IN | TEMPERATURE: 98.3 F | RESPIRATION RATE: 16 BRPM | HEART RATE: 80 BPM | DIASTOLIC BLOOD PRESSURE: 80 MMHG | WEIGHT: 145.99 LBS

## 2018-05-15 DIAGNOSIS — J45.20 MILD INTERMITTENT ASTHMA, UNCOMPLICATED: ICD-10-CM

## 2018-05-15 DIAGNOSIS — I45.10 UNSPECIFIED RIGHT BUNDLE-BRANCH BLOCK: ICD-10-CM

## 2018-05-15 DIAGNOSIS — M19.90 UNSPECIFIED OSTEOARTHRITIS, UNSPECIFIED SITE: ICD-10-CM

## 2018-05-15 DIAGNOSIS — R82.90 UNSPECIFIED ABNORMAL FINDINGS IN URINE: ICD-10-CM

## 2018-05-15 PROCEDURE — 94010 BREATHING CAPACITY TEST: CPT

## 2018-05-15 PROCEDURE — 99214 OFFICE O/P EST MOD 30 MIN: CPT | Mod: 25

## 2018-05-15 NOTE — PLAN
[FreeTextEntry1] : There is no absolute contraindication to procedures.\par Abn UA does not need treatment as specimen most likely contaminated.\par Holding NSAIDs, vitamins and fish oil 7 days before surgery.\par DVT prophylaxis, close airway monitoring and O2 sat monitoring is required.\par Meds: Synthroid in AM of procedure with sip of water.\par Restart ASA and Vit D3 QD after cleared by surgery.\par Maintain low fat/chol diet.\par F/U Dr Young and she will call Dr Garzon for Uro/GYN consult to consider pelvic reconstruction.\par She will f/u as scheduled or sooner PRN

## 2018-05-15 NOTE — RESULTS/DATA
[Lab Results Reviewed] : lab  [ECG Reviewed] : ECG was reviewed [FreeTextEntry1] : 5-10-18 labs are reviewed. UA abn w/o culture. Others are unremarkable.\par \par A spirogram with diffusion was performed today. This reveals normal flow rates.\par \par 1-10-18 EKG NSR 61 with inc RBBB, good R wave progression V1-6 w/o acute ST-T abn. No change c/w 6-8-17 EKG.\par

## 2018-05-15 NOTE — COUNSELING
[Weight management counseling provided] : Weight management [Healthy eating counseling provided] : healthy eating [Activity counseling provided] : activity [None] : None [Good understanding] : Patient has a good understanding of lifestyle changes and the steps needed to achieve self management goals

## 2018-05-15 NOTE — HISTORY OF PRESENT ILLNESS
[Asthma] : asthma [( Patient denies any chest pain, claudication, dyspnea on exertion, orthopnea, palpitations or syncope )] : Patient denies any chest pain, claudication, dyspnea on exertion, orthopnea, palpitations or syncope. [Coronary Artery Disease] : no coronary artery disease [Diabetes] : no diabetes [Sleep Apnea] : no sleep apnea [COPD] : no COPD [FreeTextEntry1] : Left knee manipulation under sedation [FreeTextEntry2] : 5-17-18 [FreeTextEntry3] : Dr INGE De Anda at  [facility] [FreeTextEntry4] : "My knee pain is getting worse."\par \par The patient had an uneventful recovery after left TKR in Jan of this year. Pain was typical in the recovery period and slowly improved but she had difficulty with limitation in ROM with P.T. She is unable to flex more than 90% on her own and therapist can reach 120 degrees but this causes severe pain. "I scream when he does that." She has been told of "scar tissue bands" and Dr De Anda has scheduled manipulation with sedation at .\par \par She continues to have difficulty with vaginal drainage with pessary in place. M There have been multiple abnormal urine tests and GYN recent treated her for vaginal infection after swab culture. The patient denies urinary urgency, frequency or alvin hematuria. She is scheduled for urology f/u but is considering bladder suspension surgery. [FreeTextEntry5] : hypothyroidism

## 2018-05-15 NOTE — ASSESSMENT
[Procedure Low Risk] : the procedure risk is low [Patient Intermediate Risk] : the patient is an intermediate risk [Optimized for Surgery] : the patient is optimized for surgery [As per surgery] : as per surgery [Continue] : Continue medications as currently directed [FreeTextEntry1] : \par \par

## 2018-05-15 NOTE — PHYSICAL EXAM
[General Appearance - Alert] : alert [General Appearance - In No Acute Distress] : in no acute distress [General Appearance - Well Developed] : well developed [Sclera] : the sclera and conjunctiva were normal [PERRL With Normal Accommodation] : pupils were equal in size, round, and reactive to light [Strabismus] : no strabismus was seen [Outer Ear] : the ears and nose were normal in appearance [Hearing Threshold Finger Rub Not Dade] : hearing was normal [Examination Of The Oral Cavity] : the lips and gums were normal [Oropharynx] : the oropharynx was normal [Neck Appearance] : the appearance of the neck was normal [Neck Cervical Mass (___cm)] : no neck mass was observed [Jugular Venous Distention Increased] : there was no jugular-venous distention [Respiration, Rhythm And Depth] : normal respiratory rhythm and effort [Exaggerated Use Of Accessory Muscles For Inspiration] : no accessory muscle use [Auscultation Breath Sounds / Voice Sounds] : lungs were clear to auscultation bilaterally [Heart Rate And Rhythm] : heart rate was normal and rhythm regular [Heart Sounds] : normal S1 and S2 [Heart Sounds Gallop] : no gallops [Heart Sounds Pericardial Friction Rub] : no pericardial rub [Veins - Varicosity Changes] : there were no varicosital changes [Abdomen Soft] : soft [Cervical Lymph Nodes Enlarged Anterior Bilaterally] : anterior cervical [Supraclavicular Lymph Nodes Enlarged Bilaterally] : supraclavicular [Nail Clubbing] : no clubbing  or cyanosis of the fingernails [Motor Tone] : muscle strength and tone were normal [Skin Color & Pigmentation] : normal skin color and pigmentation [Skin Turgor] : normal skin turgor [No Focal Deficits] : no focal deficits [Affect] : the affect was normal [General Appearance - Well-Appearing] : healthy appearing [] : normal voice and communication [Thyroid Diffuse Enlargement] : the thyroid was not enlarged [Pitting Edema] : pitting edema present [___ +] : [unfilled]+ pitting edema to L ankle [Antalgic Gait Left] : antalgic on the left [Limping On The Left] : limping on the left [FreeTextEntry1] : localized swelling left knee.

## 2018-05-16 ENCOUNTER — RX RENEWAL (OUTPATIENT)
Age: 78
End: 2018-05-16

## 2018-05-17 ENCOUNTER — OUTPATIENT (OUTPATIENT)
Dept: OUTPATIENT SERVICES | Facility: HOSPITAL | Age: 78
LOS: 1 days | Discharge: HOME | End: 2018-05-17
Payer: MEDICARE

## 2018-05-17 VITALS
DIASTOLIC BLOOD PRESSURE: 81 MMHG | RESPIRATION RATE: 16 BRPM | HEIGHT: 61 IN | WEIGHT: 145.06 LBS | HEART RATE: 74 BPM | SYSTOLIC BLOOD PRESSURE: 143 MMHG | TEMPERATURE: 98 F | OXYGEN SATURATION: 100 %

## 2018-05-17 DIAGNOSIS — Z96.652 PRESENCE OF LEFT ARTIFICIAL KNEE JOINT: Chronic | ICD-10-CM

## 2018-05-17 DIAGNOSIS — Z96.651 PRESENCE OF RIGHT ARTIFICIAL KNEE JOINT: Chronic | ICD-10-CM

## 2018-05-17 DIAGNOSIS — Z90.89 ACQUIRED ABSENCE OF OTHER ORGANS: Chronic | ICD-10-CM

## 2018-05-17 DIAGNOSIS — Z98.49 CATARACT EXTRACTION STATUS, UNSPECIFIED EYE: Chronic | ICD-10-CM

## 2018-05-17 DIAGNOSIS — Z98.890 OTHER SPECIFIED POSTPROCEDURAL STATES: Chronic | ICD-10-CM

## 2018-05-17 PROCEDURE — 93010 ELECTROCARDIOGRAM REPORT: CPT

## 2018-05-17 RX ORDER — LEVOTHYROXINE SODIUM 125 MCG
50 TABLET ORAL DAILY
Qty: 0 | Refills: 0 | Status: DISCONTINUED | OUTPATIENT
Start: 2018-05-17 | End: 2018-05-18

## 2018-05-17 RX ORDER — OXYCODONE HYDROCHLORIDE 5 MG/1
5 TABLET ORAL EVERY 4 HOURS
Qty: 0 | Refills: 0 | Status: DISCONTINUED | OUTPATIENT
Start: 2018-05-17 | End: 2018-05-17

## 2018-05-17 RX ORDER — MULTIVIT-MIN/FERROUS GLUCONATE 9 MG/15 ML
1 LIQUID (ML) ORAL
Qty: 0 | Refills: 0 | COMMUNITY

## 2018-05-17 RX ORDER — ACETAMINOPHEN 500 MG
975 TABLET ORAL ONCE
Qty: 0 | Refills: 0 | Status: COMPLETED | OUTPATIENT
Start: 2018-05-17 | End: 2018-05-17

## 2018-05-17 RX ORDER — SODIUM CHLORIDE 9 MG/ML
3 INJECTION INTRAMUSCULAR; INTRAVENOUS; SUBCUTANEOUS EVERY 8 HOURS
Qty: 0 | Refills: 0 | Status: DISCONTINUED | OUTPATIENT
Start: 2018-05-17 | End: 2018-05-17

## 2018-05-17 RX ORDER — ASPIRIN/CALCIUM CARB/MAGNESIUM 324 MG
1 TABLET ORAL
Qty: 14 | Refills: 0 | OUTPATIENT
Start: 2018-05-17 | End: 2018-05-30

## 2018-05-17 RX ORDER — SODIUM CHLORIDE 9 MG/ML
1000 INJECTION, SOLUTION INTRAVENOUS
Qty: 0 | Refills: 0 | Status: DISCONTINUED | OUTPATIENT
Start: 2018-05-17 | End: 2018-05-17

## 2018-05-17 RX ORDER — ASPIRIN/CALCIUM CARB/MAGNESIUM 324 MG
325 TABLET ORAL DAILY
Qty: 0 | Refills: 0 | Status: DISCONTINUED | OUTPATIENT
Start: 2018-05-17 | End: 2018-05-18

## 2018-05-17 RX ORDER — SODIUM CHLORIDE 9 MG/ML
1000 INJECTION, SOLUTION INTRAVENOUS
Qty: 0 | Refills: 0 | Status: DISCONTINUED | OUTPATIENT
Start: 2018-05-17 | End: 2018-05-18

## 2018-05-17 RX ORDER — ONDANSETRON 8 MG/1
4 TABLET, FILM COATED ORAL EVERY 4 HOURS
Qty: 0 | Refills: 0 | Status: DISCONTINUED | OUTPATIENT
Start: 2018-05-17 | End: 2018-05-17

## 2018-05-17 RX ORDER — CELECOXIB 200 MG/1
200 CAPSULE ORAL ONCE
Qty: 0 | Refills: 0 | Status: COMPLETED | OUTPATIENT
Start: 2018-05-17 | End: 2018-05-17

## 2018-05-17 RX ORDER — MULTIVIT-MIN/FERROUS GLUCONATE 9 MG/15 ML
1 LIQUID (ML) ORAL DAILY
Qty: 0 | Refills: 0 | Status: DISCONTINUED | OUTPATIENT
Start: 2018-05-17 | End: 2018-05-18

## 2018-05-17 RX ORDER — MUPIROCIN 20 MG/G
1 OINTMENT TOPICAL
Qty: 0 | Refills: 0 | COMMUNITY

## 2018-05-17 RX ORDER — FAMOTIDINE 10 MG/ML
20 INJECTION INTRAVENOUS ONCE
Qty: 0 | Refills: 0 | Status: COMPLETED | OUTPATIENT
Start: 2018-05-17 | End: 2018-05-17

## 2018-05-17 RX ORDER — FENTANYL CITRATE 50 UG/ML
50 INJECTION INTRAVENOUS
Qty: 0 | Refills: 0 | Status: DISCONTINUED | OUTPATIENT
Start: 2018-05-17 | End: 2018-05-17

## 2018-05-17 RX ORDER — LEVOTHYROXINE SODIUM 125 MCG
1 TABLET ORAL
Qty: 0 | Refills: 0 | COMMUNITY

## 2018-05-17 RX ADMIN — CELECOXIB 200 MILLIGRAM(S): 200 CAPSULE ORAL at 10:35

## 2018-05-17 RX ADMIN — Medication 975 MILLIGRAM(S): at 10:36

## 2018-05-17 RX ADMIN — CELECOXIB 200 MILLIGRAM(S): 200 CAPSULE ORAL at 10:36

## 2018-05-17 RX ADMIN — FENTANYL CITRATE 50 MICROGRAM(S): 50 INJECTION INTRAVENOUS at 11:46

## 2018-05-17 RX ADMIN — FAMOTIDINE 20 MILLIGRAM(S): 10 INJECTION INTRAVENOUS at 10:36

## 2018-05-17 RX ADMIN — FENTANYL CITRATE 50 MICROGRAM(S): 50 INJECTION INTRAVENOUS at 12:17

## 2018-05-17 NOTE — PROGRESS NOTE ADULT - SUBJECTIVE AND OBJECTIVE BOX
pt seen at bedside, doing well, no pain left knee, currently in CPM 0-120    PE right knee   ROM 0-120  skin intact   SILT   FROM ankle and toes  DP intact   + EHL FHL GS TA   compartments soft non tender

## 2018-05-17 NOTE — PROGRESS NOTE ADULT - SUBJECTIVE AND OBJECTIVE BOX
Left IPAC Block Note:  Time out performed, sterile prep with chlorhexidine and drape, ultrasound-guided, 21G 4" stimuplex needle, good visualization of needle and nerve at all times, 20cc of 0.375% Ropivacaine injected easily, no heme after aspirating every 5cc, no intraneural injection, no paresthesia.  Procedure well tolerated without complications. Block performed as per surgeon's request.

## 2018-05-17 NOTE — PHYSICAL THERAPY INITIAL EVALUATION ADULT - MODALITIES TREATMENT COMMENTS
pt left on stretcher supine in PACU post Eval; all above lines/monitors in place; CPM L LE 0-120; RN Eleanor present; pt instructed not to get up alone; call nursing for assist; viraj well; denied pain

## 2018-05-17 NOTE — PROVIDER CONTACT NOTE (OTHER) - SITUATION
Patient was mistaken of name when asked her cardiologist.  is not Dr. Kennedy, its Dr. Jordan. SW Dr. Murillo and they will consult in the morning.

## 2018-05-17 NOTE — BRIEF OPERATIVE NOTE - PROCEDURE
<<-----Click on this checkbox to enter Procedure Manipulation of knee joint under anesthesia  05/17/2018    Active  [id number]

## 2018-05-17 NOTE — PHYSICAL THERAPY INITIAL EVALUATION ADULT - LEVEL OF INDEPENDENCE: SUPINE/SIT, REHAB EVAL
held transfer/gait assessment; pt with run of V-tach in OR as per DEBBI Marshall; pt to be Tx to telemetry

## 2018-05-17 NOTE — PROGRESS NOTE ADULT - ASSESSMENT
78F s/p LEft TKA TIFFANY,     P;  follow up cardiology for episodes of V tach  CPm 0-120   KI at bedtime  pain control   DVT ppx   veondynes  incentive spirometer

## 2018-05-17 NOTE — PHYSICAL THERAPY INITIAL EVALUATION ADULT - CRITERIA FOR SKILLED THERAPEUTIC INTERVENTIONS
will attempt completion of Eval @ later date when appropriate; further course of PT intervention pending

## 2018-05-17 NOTE — PHYSICAL THERAPY INITIAL EVALUATION ADULT - RANGE OF MOTION EXAMINATION, REHAB EVAL
pt performed L LE AA/PROM ex: HS with passive overpressure into flexion; SLR, hip Abd/Add, SAQ 3 x 10 reps each 10'

## 2018-05-17 NOTE — PROGRESS NOTE ADULT - SUBJECTIVE AND OBJECTIVE BOX
Left Adductor Canal Block Note:  Time out performed, sterile prep with chlorhexidine and drape, ultrasound-guided, 21G 4" stimuplex needle, good visualization of needle and nerve at all times, 20cc of 0.375% Ropivacaine injected easily, no heme after aspirating every 5cc, no intraneural injection, no paresthesia.  Procedure well tolerated without complications. Block performed as per surgeon's request.

## 2018-05-18 ENCOUNTER — TRANSCRIPTION ENCOUNTER (OUTPATIENT)
Age: 78
End: 2018-05-18

## 2018-05-18 VITALS
RESPIRATION RATE: 16 BRPM | SYSTOLIC BLOOD PRESSURE: 120 MMHG | TEMPERATURE: 99 F | HEART RATE: 84 BPM | OXYGEN SATURATION: 99 % | DIASTOLIC BLOOD PRESSURE: 56 MMHG

## 2018-05-18 DIAGNOSIS — M25.562 PAIN IN LEFT KNEE: ICD-10-CM

## 2018-05-18 DIAGNOSIS — M25.60 STIFFNESS OF UNSPECIFIED JOINT, NOT ELSEWHERE CLASSIFIED: ICD-10-CM

## 2018-05-18 DIAGNOSIS — M17.0 BILATERAL PRIMARY OSTEOARTHRITIS OF KNEE: ICD-10-CM

## 2018-05-18 DIAGNOSIS — E03.8 OTHER SPECIFIED HYPOTHYROIDISM: ICD-10-CM

## 2018-05-18 LAB
ANION GAP SERPL CALC-SCNC: 7 MMOL/L — SIGNIFICANT CHANGE UP (ref 5–17)
BUN SERPL-MCNC: 16 MG/DL — SIGNIFICANT CHANGE UP (ref 7–23)
CALCIUM SERPL-MCNC: 9 MG/DL — SIGNIFICANT CHANGE UP (ref 8.5–10.1)
CHLORIDE SERPL-SCNC: 109 MMOL/L — HIGH (ref 96–108)
CO2 SERPL-SCNC: 26 MMOL/L — SIGNIFICANT CHANGE UP (ref 22–31)
CREAT SERPL-MCNC: 0.51 MG/DL — SIGNIFICANT CHANGE UP (ref 0.5–1.3)
GLUCOSE SERPL-MCNC: 89 MG/DL — SIGNIFICANT CHANGE UP (ref 70–99)
HCT VFR BLD CALC: 33.7 % — LOW (ref 34.5–45)
HGB BLD-MCNC: 11.1 G/DL — LOW (ref 11.5–15.5)
MCHC RBC-ENTMCNC: 28 PG — SIGNIFICANT CHANGE UP (ref 27–34)
MCHC RBC-ENTMCNC: 32.9 GM/DL — SIGNIFICANT CHANGE UP (ref 32–36)
MCV RBC AUTO: 85.1 FL — SIGNIFICANT CHANGE UP (ref 80–100)
NRBC # BLD: 0 /100 WBCS — SIGNIFICANT CHANGE UP (ref 0–0)
PLATELET # BLD AUTO: 252 K/UL — SIGNIFICANT CHANGE UP (ref 150–400)
POTASSIUM SERPL-MCNC: 3.8 MMOL/L — SIGNIFICANT CHANGE UP (ref 3.5–5.3)
POTASSIUM SERPL-SCNC: 3.8 MMOL/L — SIGNIFICANT CHANGE UP (ref 3.5–5.3)
RBC # BLD: 3.96 M/UL — SIGNIFICANT CHANGE UP (ref 3.8–5.2)
RBC # FLD: 13.1 % — SIGNIFICANT CHANGE UP (ref 10.3–14.5)
SODIUM SERPL-SCNC: 142 MMOL/L — SIGNIFICANT CHANGE UP (ref 135–145)
WBC # BLD: 8.05 K/UL — SIGNIFICANT CHANGE UP (ref 3.8–10.5)
WBC # FLD AUTO: 8.05 K/UL — SIGNIFICANT CHANGE UP (ref 3.8–10.5)

## 2018-05-18 RX ADMIN — Medication 325 MILLIGRAM(S): at 13:12

## 2018-05-18 RX ADMIN — Medication 1 TABLET(S): at 13:12

## 2018-05-18 RX ADMIN — Medication 50 MICROGRAM(S): at 05:33

## 2018-05-18 NOTE — DISCHARGE NOTE ADULT - CARE PROVIDER_API CALL
Paul De Anda), Orthopaedic Surgery  [address] [place] [address]  Phone: [phone]  Fax: [phone]    Dustin Jordan (MD), Cardiovascular Disease; Internal Medicine; Nuclear Cardiology  [address]  [place] [address]  Phone: [phone]  Fax: [phone]

## 2018-05-18 NOTE — DISCHARGE NOTE ADULT - PATIENT PORTAL LINK FT
You can access the [facility] Patient Portal, offered by [facility], by registering with the following website: [email or web address]

## 2018-05-18 NOTE — DISCHARGE NOTE ADULT - CARE PLAN
Principal Discharge DX:	Stiffness in joint  Goal:	improved ROM and function  Assessment and plan of treatment:	Discharge Instructions for Total Knee Arthroplasty    1.  Diet: Resume previous diet  2. Activity: WBAT, Rolling walker, Daily PT. Aggressive ROM 0-full. Walk plenty.  Wear immobilizer only while asleep x 3 weeks.   4. Continue ICE packs to knee.  5. DVT PE Prophylaxis: ASpirn . See Med Rec.  9. Follow Up: Dr. De Anda in 3  weeks month;  Call to schedule.  10. Pain medications:  If going home, eRX sent to your pharmacy for . Principal Discharge DX:	Stiffness in joint  Goal:	improved ROM and function  Assessment and plan of treatment:	Discharge Instructions for Total Knee Arthroplasty    1.  Diet: Resume previous diet  2. Activity: WBAT, Rolling walker, Daily PT. Aggressive ROM 0-full. Walk plenty.  Wear immobilizer only while asleep x 3 weeks. CPM carlos 0-120 degrees 2h daily for 1 week starting immediately 5/19.  4. Continue ICE packs to knee.  5. DVT PE Prophylaxis: ASpirn . See Med Rec.  9. Follow Up: Dr. De Anda in 3  weeks month;  Call to schedule.  10. Pain medications:  If going home, eRX sent to your pharmacy for .

## 2018-05-18 NOTE — DISCHARGE NOTE ADULT - MEDICATION SUMMARY - MEDICATIONS TO TAKE
I will START or STAY ON the medications listed below when I get home from the hospital:    oxyCODONE-acetaminophen 5 mg-325 mg oral tablet  -- 1 tab(s) by mouth every 4 hours, As Needed for pain MDD:6  -- Caution federal law prohibits the transfer of this drug to any person other  than the person for whom it was prescribed.  May cause drowsiness.  Alcohol may intensify this effect.  Use care when operating dangerous machinery.  This prescription cannot be refilled.  This product contains acetaminophen.  Do not use  with any other product containing acetaminophen to prevent possible liver damage.  Using more of this medication than prescribed may cause serious breathing problems.    -- Indication: For pain, severe    aspirin 325 mg oral tablet  -- 1 tab(s) by mouth once a day for DVT prophylaxis  -- Take with food or milk.    -- Indication: For blood clot prevention    Synthroid 50 mcg (0.05 mg) oral tablet  -- 1 tab(s) by mouth once a day  -- Indication: For Home med    Daily Multi oral tablet  -- 1 tab(s) by mouth once a day  -- Indication: For vitamin

## 2018-05-18 NOTE — PROGRESS NOTE ADULT - ASSESSMENT
#S/p LT knee manipulation with prior LT TKR in 01/19/2018  Ortho eval appreciated  PT as tolerated  AC consult  Monitor HH  Pain meds prn  Bowel regimen  Incentive spirometry    #Hypothyroidism- stable    #Dispo- thank you for consult, will follow with you #S/p LT knee manipulation with prior LT TKR in 01/19/2018  #No acute cardiac arhthmias-medically and cardio cleared patient for discharge once cleared by ortho  further management per ortho  PT as tolerated  AC consult  Monitor HH  Pain meds prn  Bowel regimen  Incentive spirometry    #Hypothyroidism- stable    #Dispo- thank you for consult,

## 2018-05-18 NOTE — DISCHARGE NOTE ADULT - MEDICATION SUMMARY - MEDICATIONS TO STOP TAKING
I will STOP taking the medications listed below when I get home from the hospital:    mupirocin 2% nasal ointment  -- 1 application into nose 3 times a day    not for mrsa, has a sore on her nose and got this for it from her private MD

## 2018-05-18 NOTE — DISCHARGE NOTE ADULT - PLAN OF CARE
improved ROM and function Discharge Instructions for Total Knee Arthroplasty    1.  Diet: Resume previous diet  2. Activity: WBAT, Rolling walker, Daily PT. Aggressive ROM 0-full. Walk plenty.  Wear immobilizer only while asleep x 3 weeks.   4. Continue ICE packs to knee.  5. DVT PE Prophylaxis: ASpirn . See Med Rec.  9. Follow Up: Dr. De Anda in 3  weeks month;  Call to schedule.  10. Pain medications:  If going home, eRX sent to your pharmacy for . Discharge Instructions for Total Knee Arthroplasty    1.  Diet: Resume previous diet  2. Activity: WBAT, Rolling walker, Daily PT. Aggressive ROM 0-full. Walk plenty.  Wear immobilizer only while asleep x 3 weeks. CPM carlos 0-120 degrees 2h daily for 1 week starting immediately 5/19.  4. Continue ICE packs to knee.  5. DVT PE Prophylaxis: ASpirn . See Med Rec.  9. Follow Up: Dr. De Anda in 3  weeks month;  Call to schedule.  10. Pain medications:  If going home, eRX sent to your pharmacy for .

## 2018-05-18 NOTE — CONSULT NOTE ADULT - SUBJECTIVE AND OBJECTIVE BOX
PCP- DR Abbott    CC- s/p LT knee manipulation    HPI:  79yo/F with PMH hypothyroidism, OA, prior LT TKR on 01/19/2018 presented for LT knee manipulation. Medical consult called for medical management postop    PMH- as above  PSH- LT TKR, RT TKR, b/l cataract, tonsillectomy  Soc hx- denies smoking, alcohol-socially  Fam hx- f had lung ca, m- DM    5/17/18- seen in PACU, doing good    Review of system- All 10 systems reviewed and is as per HPI otherwise negative.     T(C): 37.1 (05-17-18 @ 13:30), Max: 37.1 (05-17-18 @ 13:30)  HR: 91 (05-17-18 @ 15:00) (67 - 94)  BP: 142/72 (05-17-18 @ 15:00) (114/80 - 159/79)  RR: 24 (05-17-18 @ 15:00) (10 - 24)  SpO2: 100% (05-17-18 @ 15:00) (95% - 100%)  Wt(kg): --    LABS:      RADIOLOGY & ADDITIONAL TESTS:      PHYSICAL EXAM:  GENERAL: NAD, well-groomed, well-developed  HEAD:  Atraumatic, Normocephalic  EYES: EOMI, PERRLA, conjunctiva and sclera clear  HEENT: Moist mucous membranes  NECK: Supple, No JVD  NERVOUS SYSTEM:  Alert & Oriented X3, Motor Strength 5/5 B/L upper and lower extremities; DTRs 2+ intact and symmetric  CHEST/LUNG: Clear to auscultation bilaterally; No rales, rhonchi, wheezing, or rubs  HEART: Regular rate and rhythm; No murmurs, rubs, or gallops  ABDOMEN: Soft, Nontender, Nondistended; Bowel sounds present  GENITOURINARY- Voiding, no palpable bladder  EXTREMITIES:  2+ Peripheral Pulses, No clubbing, cyanosis, or edema  MUSCULOSKELTAL- LT knee dressing dry  SKIN-no rash, no lesion  CNS- alert, oriented X3, non focal     fentaNYL    Injectable 50 MICROGram(s) IV Push every 5 minutes PRN  lactated ringers. 1000 milliLiter(s) IV Continuous <Continuous>  lactated ringers. 1000 milliLiter(s) IV Continuous <Continuous>  ondansetron Injectable 4 milliGRAM(s) IV Push every 4 hours PRN  oxyCODONE    IR 5 milliGRAM(s) Oral every 4 hours PRN    Assessment/Plan  #S/p LT knee manipulation with prior LT TKR in 01/19/2018  Ortho eval appreciated  PT as tolerated  AC consult  Monitor HH  Pain meds prn  Bowel regimen  Incentive spirometry    #Hypothyroidism- stable    #Dispo- thank you for consult, will follow with you
CHIEF COMPLAINT:  Patient is a 78y old  Female who presents with a chief complaint of "left knee replaced" (17 May 2018 19:31)      HPI:  18:  79 y/o female with a h/o hypothyroidism on meds, with complaints of left knee pain and limited ROM. S/P right knee replacement in  and left TKR on 2018. She had manipulation of left knee under anesthesia and post op was suspected of having VT in the recovery room.  Review of the monitor strips suggest artifact and not significant arrhythmias.  She has not had any arrhythmias on the monitor since and she remains otherwise asymptomatic without anginal chest pains, SOB palpitations, dizziness or syncope.  She had a pre-op cardiac evaluation with a normal stress echo on 1/15/18 before her last left knee replacement with echo showing normal LV size and function, normal chamber sizes and no significant valvular disease except for mild, insignificant TR and PI and no evidence for ischemia.  She is eager to go home.        PMHx:  PAST MEDICAL & SURGICAL HISTORY:  Stiffness in joint: left knee  Pneumonia: ?  Basal cell carcinoma (BCC) in situ of skin: h/o on nose- Moh's procedure-  Osteoarthritis of knee  Knee pain, left  Hypothyroid  H/O total right knee replacement:   h/o total left knee replacement, left: 2018  S/P cataract surgery: b/l  S/P tonsillectomy  History of total knee replacement, right  S/P colonoscopy with polypectomy      FAMILY HISTORY:   FAMILY HISTORY:  Family history of prostate cancer (Sibling)  Family history of pancreatic cancer (Mother)  Family history of diabetes mellitus (Mother)  Family history of lung cancer (Father)      ALLERGIES:  Allergies  No Known Allergies      REVIEW OF SYSTEMS:    CONSTITUTIONAL: No weakness, fevers or chills  EYES/ENT: No visual changes;  No vertigo or throat pain   NECK: No pain or stiffness  RESPIRATORY: No cough, wheezing, hemoptysis; No shortness of breath  CARDIOVASCULAR: No chest pain or palpitations  GASTROINTESTINAL: No abdominal or epigastric pain. No nausea, vomiting, or hematemesis; No diarrhea or constipation. No melena or hematochezia.  GENITOURINARY: No dysuria, frequency or hematuria  NEUROLOGICAL: No numbness or weakness  SKIN: No itching, burning, rashes, or lesions   All other review of systems is negative unless indicated above    Vital Signs Last 24 Hrs  T(C): 36.5 (18 May 2018 04:46), Max: 37.1 (17 May 2018 13:30)  T(F): 97.7 (18 May 2018 04:46), Max: 98.8 (17 May 2018 13:30)  HR: 82 (18 May 2018 04:46) (67 - 94)  BP: 125/61 (18 May 2018 04:46) (101/85 - 159/79)  BP(mean): --  RR: 18 (17 May 2018 20:10) (10 - 24)  SpO2: 99% (18 May 2018 04:46) (95% - 100%)    I&O's Summary    17 May 2018 07:01  -  18 May 2018 07:00  --------------------------------------------------------  IN: 400 mL / OUT: 250 mL / NET: 150 mL      PHYSICAL EXAM:   Constitutional: NAD, awake and alert, well-developed  HEENT: PERR, EOMI, Normal Hearing, MMM  Neck: Soft and supple, No LAD, No JVD  Respiratory: Breath sounds are clear bilaterally, No wheezing, rales or rhonchi  Cardiovascular: S1 and S2, regular rate and rhythm, soft [place] at LLSB and base as before, no gallops or rubs  Gastrointestinal: Bowel Sounds present, soft, nontender, nondistended, no guarding, no rebound  Extremities: No peripheral edema, bilateral knee scars well healed  Vascular: 2+ peripheral pulses  Neurological: A/O x 3, no focal deficits  Musculoskeletal: 5/5 strength b/l upper and lower extremities  Skin: No rashes      MEDICATIONS  (STANDING):  aspirin 325 milliGRAM(s) Oral daily  lactated ringers. 1000 milliLiter(s) (75 mL/Hr) IV Continuous <Continuous>  levothyroxine 50 MICROGram(s) Oral daily  multivitamin/minerals 1 Tablet(s) Oral daily      LABS: All Labs Reviewed:                        11.1   8.05  )-----------( 252      ( 18 May 2018 05:41 )             33.7     05-18    142  |  109<H>  |  16  ----------------------------<  89  3.8   |  26  |  0.51    Ca    9.0      18 May 2018 05:41      BLOOD CULTURES:   LIPID PROFILE     RADIOLOGY:      EK18:  Normal sinus rhythm  Questionable old Inferior infarct vs normal variant  ST & T wave abnormality, as before  When compared with ECG of 10-MARILU-2018 11:38, No significant change was found      TELEMETRY:  18-18:  NSR    ECHO:   Normal stress echo on 1/15/18 before her left knee replacement with echo showing normal LV size and function, normal chamber sizes and no significant valvular disease except for mild, insignificant TR and PI and no evidence for ischemia.

## 2018-05-18 NOTE — DISCHARGE NOTE ADULT - HOSPITAL COURSE
H&P:  Pt is a78y Female PAST MEDICAL & SURGICAL HISTORY:  Stiffness in joint: left knee  Pneumonia: 2010?  Basal cell carcinoma (BCC) in situ of skin: h/o on nose  Osteoarthritis of knee  Knee pain, left  Hypothyroid  H/O total knee replacement, left: 01/19/2018  S/P cataract surgery: b/l  S/P tonsillectomy  History of total knee replacement, right  S/P colonoscopy with polypectomy       Now s/p Left Total Knee TIFFANY for arthrofibrosis/stiffness. Pt is afebrile with stable vital signs. Pain is controlled. Alert and Oriented. Exam reveals intact EHL FHL TA GS, +DP.     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 Pt was admitted overnight for CPM and ROM, analgesia. She had some runs of cardiac dysrhythmia in PACU and was admitted to telemetry overnight She was seen by cardiology. She is for DC home today and stable.    Routine consults were obtained from the Anticoagulation Team for DVT/PE prophylaxis, from Physical Therapy for twice daily for 2h per session CPM as well as Ambulation, and from the Hospitalist for Medical Co-management. Patient was placed on  ECASA 325 BID for anticoagulation.  Pertinent home medications were continued.  The plan is for DC to home today.  The orthopedic Attending is aware and agrees. H&P:  Pt is a78y Female PAST MEDICAL & SURGICAL HISTORY:  Stiffness in joint: left knee  Pneumonia: 2010?  Basal cell carcinoma (BCC) in situ of skin: h/o on nose  Osteoarthritis of knee  Knee pain, left  Hypothyroid  H/O total knee replacement, left: 01/19/2018  S/P cataract surgery: b/l  S/P tonsillectomy  History of total knee replacement, right  S/P colonoscopy with polypectomy       Now s/p Left Total Knee TIFFANY for arthrofibrosis/stiffness. Pt is afebrile with stable vital signs. Pain is controlled. Alert and Oriented. Exam reveals intact EHL FHL TA GS, +DP.     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 Pt was admitted overnight for CPM and aggressive ROM, analgesia. She had some runs of cardiac dysrhythmia in PACU and was admitted to telemetry overnight She was seen by cardiology. She was transferred to [address] [place] [address] for PT. She is for DC home today after PT and stable.    Routine consults were obtained from the Anticoagulation Team for DVT/PE prophylaxis, from Physical Therapy for twice daily for 2h per session CPM as well as Ambulation, and from the Hospitalist for Medical Co-management. Patient was placed on  ECASA 325 BID for anticoagulation.  Pertinent home medications were continued.  The plan is for DC to home today.  The orthopedic Attending is aware and agrees.

## 2018-05-18 NOTE — CONSULT NOTE ADULT - ASSESSMENT
5/18/18:  Pt with above history and ??? arrhythmias seen in recovery, some of which appear to be artifact.  She remains asymptomatic with a normal recent echo and stress echo and she is clinically stable for discharge.  No indication for further cardiac testing at this time.  No indication for arrhythmia treatment at this time or other change in meds.  She can be discharged from a cardiac standpoint and can follow up with Dr Jordan as an outpt as needed.  Follow up with Dr De Anda as scheduled for pot op follow up.  Continue her other pre-admission meds as before.  Home today if ok with Ortho.

## 2018-05-18 NOTE — PROGRESS NOTE ADULT - SUBJECTIVE AND OBJECTIVE BOX
· Subjective and Objective: 	  PCP- DR Abbott    CC- s/p LT knee manipulation    HPI:  77yo/F with PMH hypothyroidism, OA, prior LT TKR on 01/19/2018 presented for LT knee manipulation. Medical consult called for medical management postop    PMH- as above  PSH- LT TKR, RT TKR, b/l cataract, tonsillectomy  Soc hx- denies smoking, alcohol-socially  Fam hx- f had lung ca, m- DMPHYSICAL EXAM:  GENERAL: NAD, well-groomed, well-developed  HEAD:  Atraumatic, Normocephalic  EYES: EOMI, PERRLA, conjunctiva and sclera clear  HEENT: Moist mucous membranes  NECK: Supple, No JVD  NERVOUS SYSTEM:  Alert & Oriented X3, Motor Strength 5/5 B/L upper and lower extremities; DTRs 2+ intact and symmetricCHEST/LUNG: Clear to auscultation bilaterally; No rales, rhonchi, wheezing, or rubs  HEART: Regular rate and rhythm; No murmurs, rubs, or gallops  ABDOMEN: Soft, Nontender, Nondistended; Bowel sounds present  GENITOURINARY- Voiding, no palpable bladder  EXTREMITIES:  2+ Peripheral Pulses, No clubbing, cyanosis, or edema  MUSCULOSKELTAL- LT knee dressing dry  SKIN-no rash, no lesion  CNS- alert, oriented X3, non focal · Subjective and Objective: 	  PCP- DR Abbott    CC- s/p LT knee manipulation    HPI:  77yo/F with PMH hypothyroidism, OA, prior LT TKR on 01/19/2018 presented for LT knee manipulation. Medical consult called for medical management postop    PMH- as above  PSH- LT TKR, RT TKR, b/l cataract, tonsillectomy  Soc hx- denies smoking, alcohol-socially  Fam hx- f had lung ca, m- DM  5/18- patient denies any complaints, is angry because she has been waiting to be discharge    PHYSICAL EXAM:  GENERAL: NAD, well-groomed, well-developed  HEAD:  Atraumatic, Normocephalic  EYES: EOMI, PERRLA, conjunctiva and sclera clear  HEENT: Moist mucous membranes  NECK: Supple, No JVD  NERVOUS SYSTEM:  Alert & Oriented X3, Motor Strength 5/5 B/L upper and lower extremities; DTRs 2+ intact and symmetricCHEST/LUNG: Clear to auscultation bilaterally; No rales, rhonchi, wheezing, or rubs  HEART: Regular rate and rhythm; No murmurs, rubs, or gallops  ABDOMEN: Soft, Nontender, Nondistended; Bowel sounds present  GENITOURINARY- Voiding, no palpable bladder  EXTREMITIES:  2+ Peripheral Pulses, No clubbing, cyanosis, or edema  MUSCULOSKELTAL- LT knee dressing dry  SKIN-no rash, no lesion  CNS- alert, oriented X3, non focal       PHYSICAL EXAM:    Daily     Daily     ICU Vital Signs Last 24 Hrs  T(C): 36.7 (18 May 2018 09:57), Max: 37.1 (17 May 2018 13:30)  T(F): 98 (18 May 2018 09:57), Max: 98.8 (17 May 2018 13:30)  HR: 75 (18 May 2018 09:57) (67 - 94)  BP: 127/63 (18 May 2018 09:57) (101/85 - 159/79)  BP(mean): --  ABP: --  ABP(mean): --  RR: 18 (18 May 2018 09:57) (10 - 24)  SpO2: 100% (18 May 2018 09:57) (95% - 100%)                                11.1   8.05  )-----------( 252      ( 18 May 2018 05:41 )             33.7       CBC Full  -  ( 18 May 2018 05:41 )  WBC Count : 8.05 K/uL  Hemoglobin : 11.1 g/dL  Hematocrit : 33.7 %  Platelet Count - Automated : 252 K/uL  Mean Cell Volume : 85.1 fl  Mean Cell Hemoglobin : 28.0 pg  Mean Cell Hemoglobin Concentration : 32.9 gm/dL  Auto Neutrophil # : x  Auto Lymphocyte # : x  Auto Monocyte # : x  Auto Eosinophil # : x  Auto Basophil # : x  Auto Neutrophil % : x  Auto Lymphocyte % : x  Auto Monocyte % : x  Auto Eosinophil % : x  Auto Basophil % : x      05-18    142  |  109<H>  |  16  ----------------------------<  89  3.8   |  26  |  0.51    Ca    9.0      18 May 2018 05:41                              MEDICATIONS  (STANDING):  aspirin 325 milliGRAM(s) Oral daily  lactated ringers. 1000 milliLiter(s) (75 mL/Hr) IV Continuous <Continuous>  levothyroxine 50 MICROGram(s) Oral daily  multivitamin/minerals 1 Tablet(s) Oral daily

## 2018-05-22 ENCOUNTER — APPOINTMENT (OUTPATIENT)
Dept: INTERNAL MEDICINE | Facility: CLINIC | Age: 78
End: 2018-05-22
Payer: MEDICARE

## 2018-05-22 VITALS
OXYGEN SATURATION: 96 % | BODY MASS INDEX: 26.62 KG/M2 | HEART RATE: 76 BPM | WEIGHT: 141 LBS | TEMPERATURE: 98 F | DIASTOLIC BLOOD PRESSURE: 62 MMHG | SYSTOLIC BLOOD PRESSURE: 110 MMHG | HEIGHT: 61 IN | RESPIRATION RATE: 16 BRPM

## 2018-05-22 DIAGNOSIS — Z87.09 PERSONAL HISTORY OF OTHER DISEASES OF THE RESPIRATORY SYSTEM: ICD-10-CM

## 2018-05-22 DIAGNOSIS — R22.0 LOCALIZED SWELLING, MASS AND LUMP, HEAD: ICD-10-CM

## 2018-05-22 PROCEDURE — 99213 OFFICE O/P EST LOW 20 MIN: CPT

## 2018-05-22 RX ORDER — MUPIROCIN CALCIUM 20 MG/G
2 OINTMENT TOPICAL 3 TIMES DAILY
Qty: 14 | Refills: 3 | Status: DISCONTINUED | COMMUNITY
Start: 2018-05-15 | End: 2018-05-22

## 2018-05-22 NOTE — ASSESSMENT
[FreeTextEntry1] : nasal folliculitis\par continue Bactroban ointment BID  x 7 days \par S/s of infection discussed with pt, fever, purulent discharge, swelling \par If sx's  not improving , f/up with Dermatologist for eval/ further treatment \par Call / return  as needed \par  \par

## 2018-05-22 NOTE — REVIEW OF SYSTEMS
[Negative] : Psychiatric [Fever] : no fever [Chills] : no chills [Fatigue] : no fatigue [Nasal Discharge] : no nasal discharge [FreeTextEntry4] : see HPI

## 2018-05-22 NOTE — PHYSICAL EXAM
[No Acute Distress] : no acute distress [Well Nourished] : well nourished [Well Developed] : well developed [Supple] : supple [No Lymphadenopathy] : no lymphadenopathy [No Respiratory Distress] : no respiratory distress  [Clear to Auscultation] : lungs were clear to auscultation bilaterally [No Accessory Muscle Use] : no accessory muscle use [Normal Rate] : normal rate  [Regular Rhythm] : with a regular rhythm [Normal S1, S2] : normal S1 and S2 [Normal Posterior Cervical Nodes] : no posterior cervical lymphadenopathy [No Focal Deficits] : no focal deficits [Normal Affect] : the affect was normal [Normal Insight/Judgement] : insight and judgment were intact [de-identified] : left  nare   + erythema, no visible hightower noted inside nare, no swelling

## 2018-05-22 NOTE — HISTORY OF PRESENT ILLNESS
[FreeTextEntry8] : Pt presents with complaints of nasal folliculitis . She had inter nasal waxing done a few weeks ago to both nares. Last week she noticed the left nare was reddened inside with a small hightower, that has since resolved.  She was seen in the office at the time for medical clearance before left knee manipulation and given Bactroban nasal ointment 2%. She has been using it daily.  She reports her left nare is is still reddened inside . Denies purulent drainage, fever, chills.

## 2018-05-23 DIAGNOSIS — I70.0 ATHEROSCLEROSIS OF AORTA: ICD-10-CM

## 2018-05-23 DIAGNOSIS — E06.3 AUTOIMMUNE THYROIDITIS: ICD-10-CM
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
## 2018-05-23 DIAGNOSIS — J45.20 MILD INTERMITTENT ASTHMA, UNCOMPLICATED: ICD-10-CM

## 2018-05-23 DIAGNOSIS — I65.23 OCCLUSION AND STENOSIS OF BILATERAL CAROTID ARTERIES: ICD-10-CM

## 2018-05-23 DIAGNOSIS — M24.662 ANKYLOSIS, LEFT KNEE: ICD-10-CM

## 2018-05-23 DIAGNOSIS — T84.84XA PAIN DUE TO INTERNAL ORTHOPEDIC PROSTHETIC DEVICES, IMPLANTS AND GRAFTS, INITIAL ENCOUNTER: ICD-10-CM

## 2018-05-23 DIAGNOSIS — E03.9 HYPOTHYROIDISM, UNSPECIFIED: ICD-10-CM

## 2018-05-23 DIAGNOSIS — Z85.828 PERSONAL HISTORY OF OTHER MALIGNANT NEOPLASM OF SKIN: ICD-10-CM

## 2018-05-23 DIAGNOSIS — M85.80 OTHER SPECIFIED DISORDERS OF BONE DENSITY AND STRUCTURE, UNSPECIFIED SITE: ICD-10-CM

## 2018-05-23 DIAGNOSIS — I45.10 UNSPECIFIED RIGHT BUNDLE-BRANCH BLOCK: ICD-10-CM

## 2018-05-23 DIAGNOSIS — Z79.82 LONG TERM (CURRENT) USE OF ASPIRIN: ICD-10-CM

## 2018-05-23 DIAGNOSIS — E55.9 VITAMIN D DEFICIENCY, UNSPECIFIED: ICD-10-CM

## 2018-05-23 DIAGNOSIS — M17.11 UNILATERAL PRIMARY OSTEOARTHRITIS, RIGHT KNEE: ICD-10-CM

## 2018-05-23 DIAGNOSIS — Z87.891 PERSONAL HISTORY OF NICOTINE DEPENDENCE: ICD-10-CM

## 2018-05-23 DIAGNOSIS — M19.90 UNSPECIFIED OSTEOARTHRITIS, UNSPECIFIED SITE: ICD-10-CM

## 2018-05-23 DIAGNOSIS — L65.9 NONSCARRING HAIR LOSS, UNSPECIFIED: ICD-10-CM

## 2018-05-23 DIAGNOSIS — I49.9 CARDIAC ARRHYTHMIA, UNSPECIFIED: ICD-10-CM

## 2018-06-27 ENCOUNTER — APPOINTMENT (OUTPATIENT)
Dept: DERMATOLOGY | Facility: CLINIC | Age: 78
End: 2018-06-27
Payer: MEDICARE

## 2018-06-27 DIAGNOSIS — L72.9 FOLLICULAR CYST OF THE SKIN AND SUBCUTANEOUS TISSUE, UNSPECIFIED: ICD-10-CM

## 2018-06-27 PROCEDURE — 99213 OFFICE O/P EST LOW 20 MIN: CPT

## 2018-07-16 PROBLEM — J18.9 PNEUMONIA, UNSPECIFIED ORGANISM: Chronic | Status: ACTIVE | Noted: 2018-01-10

## 2018-07-17 ENCOUNTER — APPOINTMENT (OUTPATIENT)
Dept: INTERNAL MEDICINE | Facility: CLINIC | Age: 78
End: 2018-07-17
Payer: MEDICARE

## 2018-07-17 VITALS
RESPIRATION RATE: 16 BRPM | SYSTOLIC BLOOD PRESSURE: 118 MMHG | BODY MASS INDEX: 26.43 KG/M2 | DIASTOLIC BLOOD PRESSURE: 68 MMHG | OXYGEN SATURATION: 99 % | TEMPERATURE: 97.5 F | WEIGHT: 140 LBS | HEIGHT: 61 IN | HEART RATE: 62 BPM

## 2018-07-17 PROBLEM — E03.9 HYPOTHYROIDISM, UNSPECIFIED: Chronic | Status: ACTIVE | Noted: 2018-01-10

## 2018-07-17 PROBLEM — D04.9 CARCINOMA IN SITU OF SKIN, UNSPECIFIED: Chronic | Status: ACTIVE | Noted: 2018-01-10

## 2018-07-17 PROBLEM — M25.60 STIFFNESS OF UNSPECIFIED JOINT, NOT ELSEWHERE CLASSIFIED: Chronic | Status: ACTIVE | Noted: 2018-05-10

## 2018-07-17 PROBLEM — M25.562 PAIN IN LEFT KNEE: Chronic | Status: ACTIVE | Noted: 2018-01-10

## 2018-07-17 PROBLEM — M17.10 UNILATERAL PRIMARY OSTEOARTHRITIS, UNSPECIFIED KNEE: Chronic | Status: ACTIVE | Noted: 2018-01-10

## 2018-07-17 PROCEDURE — 94060 EVALUATION OF WHEEZING: CPT

## 2018-07-17 PROCEDURE — 94727 GAS DIL/WSHOT DETER LNG VOL: CPT

## 2018-07-17 PROCEDURE — 99214 OFFICE O/P EST MOD 30 MIN: CPT | Mod: 25

## 2018-07-17 PROCEDURE — 94729 DIFFUSING CAPACITY: CPT

## 2018-07-17 PROCEDURE — ZZZZZ: CPT

## 2018-07-17 RX ORDER — MUPIROCIN 20 MG/G
2 OINTMENT TOPICAL TWICE DAILY
Qty: 1 | Refills: 0 | Status: DISCONTINUED | COMMUNITY
Start: 2018-05-15 | End: 2018-07-17

## 2018-07-17 RX ORDER — ASPIRIN 81 MG/1
81 TABLET ORAL DAILY
Refills: 0 | Status: DISCONTINUED | COMMUNITY
End: 2018-07-17

## 2018-07-17 RX ORDER — MULTIVITAMIN
TABLET ORAL
Refills: 0 | Status: DISCONTINUED | COMMUNITY
End: 2018-07-17

## 2018-07-23 PROBLEM — C44.311 BASAL CELL CARCINOMA OF NOSE: Status: RESOLVED | Noted: 2017-03-23 | Resolved: 2018-07-23

## 2018-07-27 DIAGNOSIS — E53.8 DEFICIENCY OF OTHER SPECIFIED B GROUP VITAMINS: ICD-10-CM

## 2018-08-28 ENCOUNTER — RX RENEWAL (OUTPATIENT)
Age: 78
End: 2018-08-28

## 2018-11-29 ENCOUNTER — RX RENEWAL (OUTPATIENT)
Age: 78
End: 2018-11-29

## 2018-11-29 RX ORDER — LEVOTHYROXINE SODIUM 50 UG/1
50 TABLET ORAL DAILY
Qty: 90 | Refills: 0 | Status: ACTIVE | COMMUNITY
Start: 2017-11-20 | End: 1900-01-01

## 2019-01-29 ENCOUNTER — APPOINTMENT (OUTPATIENT)
Dept: INTERNAL MEDICINE | Facility: CLINIC | Age: 79
End: 2019-01-29

## 2019-06-04 ENCOUNTER — TRANSCRIPTION ENCOUNTER (OUTPATIENT)
Age: 79
End: 2019-06-04

## 2020-01-02 NOTE — H&P PST ADULT - NSWEIGHTCALCTOOLDRUG_GEN_A_CORE
Patient:Kellye Hoover  :   1961  Date of Visit: 2020    Notes from the Office Of:  Víctor Fitzpatrick MD   [address]   [address] [place] [id number]    Phone:   [phone]        Kelley Hoover is a 58 year old female here for  Chief Complaint   Patient presents with   • Cancer     Malignant neoplasm of lower-inner quadrant of right female breast, unspecified estrogen receptor status      Denies latex allergy or sensitivity.    Medication verified, no changes.  PCP and Pharmacy verified.    Social History     Tobacco Use   Smoking Status Former Smoker   • Packs/day: 0.10   • Years: 20.00   • Pack years: 2.00   • Types: Cigarettes   • Last attempt to quit: 2003   • Years since quittin.0   Smokeless Tobacco Never Used   Tobacco Comment    \     Advance Directives Filed: No    ECOG:      Height: No.  Ht Readings from Last 1 Encounters:   20 5' 9.5\" (1.765 m)     Weight:Yes, shoes on.  Wt Readings from Last 3 Encounters:   19 121.6 kg   19 121.8 kg   10/14/19 126.1 kg       BMI: Body mass index is 39.01 kg/m².    REVIEW OF SYSTEMS  GENERAL:  Patient denies headache, fevers, chills, night sweats, excessive fatigue, change in appetite, weight loss, dizziness  ALLERGIC/IMMUNOLOGIC: Verified allergies: Yes  EYES:  Patient denies significant visual difficulties, double vision, blurred vision  ENT/MOUTH: Patient denies problems with hearing, sore throat, sinus drainage, mouth sores  ENDOCRINE:  Patient denies diabetes, thyroid disease, hormone replacement, hot flashes  HEMATOLOGIC/LYMPHATIC: Patient denies easy bruising, bleeding, tender lymph nodes, swollen lymph nodes  BREASTS: Patient denies abnormal masses of breast, nipple discharge, pain  RESPIRATORY:  Patient denies lung pain with breathing, cough, coughing up blood, shortness of breath  CARDIOVASCULAR:  Patient denies anginal chest pain, palpitations, shortness of breath when lying flat, peripheral  edema  GASTROINTESTINAL: Patient denies abdominal pain , nausea, vomiting, diarrhea, GI bleeding, constipation, change in bowel habits, heartburn, sensation of feeling full, difficulty swallowing  : Patient denies abnormal genital masses, blood in the urine, frequency, urgency, burning with urination, hesitancy, incontinence, vaginal bleeding, discharge  MUSCULOSKELETAL:  Patient denies joint pain, bone pain, joint swelling, redness, decreased range of motion  SKIN:  Patient denies chronic rashes, inflammation, ulcerations, skin changes, itching  NEUROLOGIC:  Patient denies loss of balance, areas of focal weakness, abnormal gait, sensory problems, numbness, tingling  PSYCHIATRIC: Patient denies insomnia, depression, anxiety    DIAGNOSIS:    1. Stage IV [id number]  2. 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  The patient now returns after additional imaging and a diagnostic bronchoscopy with EBUS The patient is accompanied by her spouse.    ONCOLOGIC HISTORY:    BREAST CANCER:  Kelley Hoover has undergone regular screening mammography. On 08/25/2010, annual screening mammography showed a lower inner quadrant right breast mass. On 08/31/2010, she underwent additional diagnostic mammographic views and targeted ultrasonography, which confirmed a subcentimeter suspicious lower inner quadrant right breast mass. No other abnormalities were identified. The patient did not have MRI evaluation of the breast. On 08/31/2010, she underwent an image-guided right breast biopsy. Pathology ([facility] [id number]) showed a grade 1 infiltrating ductal carcinoma  at least 1.3 cm in size. There was no evidence of lymphovascular invasion. ER and IA were both listed as positive. The strength of staining and percentage of cell staining were not listed. HER-2 was 1+. FISH was negative at a ratio of 1.3. S-phase was 9.4% and DNA ploidy was 1.7/aneuploid. The patient was then taken to the OR by Dr. Ulloa who performed a right breast conservational surgical procedure and sentinel node biopsy on 09/20/2010. Pathology ([id number]) showed a 1.8-cm, grade 1, infiltrating ductal carcinoma of the right breast. All margins were negative. There was lymphovascular invasion. Initial sentinel lymph node evaluation intraoperatively was negative. However, on final pathology, there micrometastatic disease to the sentinel lymph node with 4 foci, the largest of which was 1 mm; all confined to the lymph node. Based on this result, the patient was returned to the OR on 09/27/2010 for level 1/2 axillary lymph node dissection. Pathology ([id number]) showed 18 lymph nodes, all of which negative for malignancy. The patient was seen in postoperative consultation to discuss prognosis and adjuvant treatment options.  We decided to pursue an adjuvant treatment plan consisting of dose dense Adriamycin/Cytoxan chemotherapy x4 cycles followed by dose dense Taxol x4 cycles.  The patient received chemotherapy from 10/18/10-1/24/10.  Adjuvant whole breast radiation therapy was given under the direction of Dr. Dave.  On 2/16/11 the patient started adjuvant endocrine therapy with Zoladex plus tamoxifen.  During chemotherapy she developed secondary amenorrhea.  She has had no return of menstrual cycles. She has been followed since that time with no evidence of local regional or systemic recurrence and no evidence of a new breast primary until December 2019.    LUNG CANCER:  The patient presented with progressive dry cough with right lateral chest wall pain.  An abnormal chest x-ray led to a CT scan of the chest that  showed it large right upper lobe pulmonary mass with right upper lobe bronchus obstruction and partial right upper lobe atelectasis.  There was evidence of right hilar and mediastinal lymphadenopathy, a suspected liver metastasis and an 8th right rib metastasis.  Additional staging studies included an MRI scan of the brain that showed no evidence of brain metastasis and a PET-CT which showed a dominant right upper lobe hypermetabolic mass with right hilar mediastinal an extra regional lymph node metastases.  There was also a suspected liver metastasis and multiple bony metastases including a dominant right proximal femur metastasis.  Bronchoscopy with EBUS showed adenocarcinoma consistent with pulmonary primary.    The patient's hematologic/oncologic (Stage IB Right Breast Cancer) history is summarized in outline format as follows:  Problem   Malignant Neoplasm of Upper Lobe of Right Lung (Cms/Hcc)    Presentation:   Persistent cough and right chest wall pain.  Abnormal chest x-ray.   CT-chest (12/16/2019):  There is a 3 cm right upper lobe pulmonary mass with occlusion of the right upper lobe bronchus and partial right upper lobe atelectasis.  There is right hilar and mediastinal lymphadenopathy.  There are multiple pulmonary metastases, I suspected liver metastasis and and 8th rib metastasis on the right.   MRI-brain (12/24/2019):  No evidence of metastatic disease.   Bronchoscopy with EBUS (12/26/19):  Positive for malignancy.  Adenocarcinoma with IHC consistent with primary lung cancer. STMP and lung cancer fusion panel-pending.   PET-CT (12/27/2019):  Hypermetabolic dominant right upper lobe mass with multiple hypermetabolic pulmonary metastases, supraclavicular/intrathoracic/portal caval lorena metastases, and multiple osseous metastasis including a large proximal right femur mass.    Initial Staging:   Stage IV NSCLC-RUL   cT2a cN3 M1c   Adenocarcinoma    Treatment:   Systemic treatment options discussed.   Final recommendations pending molecular profiling of the tumor.    Pathology:  1. Bronchoscopy with EBUS (12/27/2019):  Positive for malignancy.  Adenocarcinoma consistent with primary pulmonary-acinar type.  IHC consistent with lung cancer (not breast cancer):  TTF 1 and napsin a both positive; GATA3, mammaglobin, GCDFP 15 all negative; ER/OR both negative; HER2-negative (IHC 1+).     Stage IB (pT1c pN1mi M0) Right Breast Cancer    Presentation:   Abnormal screening mammogram (8/25/10): LIQ right breast mass   Additional diagnostic mammography and targeted US (8/31/10):  Confirms LIQ right breast mass    Right breast biopsy (8/31/10): positive     Initial Staging:   Stage IB Right Breast Cancer   pT1c (1.8 cm)  pN1mi (1/19, 0.1 cm, negative)  M0   Lymph node details: SNB: 1/1;  AND: 0/18   ER(+);  OR(+);  Her2-negative (IHC: 1+;  FISH: 1.3)   Grade 1 invasive ductal;  LVI-positive;  R0   Premenopausal at diagnosis (secondary amenorrhea-LMP: 11/4/10)    Treatment:   Right breast conservation surgery and sentinel lymph node biopsy (9/20/10)   Axillary lymph node dissection (9/27/10)   Adriamycin (60 mg/m2) and Cytoxan (600 mg/m2) IV day1 dose dense q 14 days with neulasta day 2    Cycle #1=10/18/10;  #2=11/1/10; #3=11/15/10; #4=11/29/10   Taxol dose dense (175,g/m2) IV day1 with neulasta day 2    Cycle #1=12/13/10; #2=12/27/10; #3=1/10/11; #4=1/24/10   XRT-whole breast   Zoladex (Goserelin) 3.6 mg SQ implant q28 days (2/16/11-11/9/11)   Tamoxifen 20 mg PO daily (2/16/11-12/14/11)   BSO (11/28/11)   Arimidex 1 mg PO daily (12/14/11-current)    Patient seen 1/2/20.  There is radiographic evidence of metastatic malignancy.  Bronchoscopy biopsy most consistent with primary lung cancer.  Workup underway to determine whether there is a concurrent metastatic breast cancer.            INTERVAL HISTORY:  The patient returns following MRI-brain, PET-CT, and bronchoscopy with EBUS.  The patient symptom complex is unchanged.  A  full review of systems is reviewed with the patient and his detailed below.    Interval past history, family history, social history, medications and allergies are reviewed with the patient and are unchanged compared to database/Epic except as noted above.    PHYSICAL EXAMINATION:  Oncology Encounter Vitals [01/02/20 0847]   ONC OP Encounter Vitals Group      /74      Pulse 96      Resp 16      Temp 98.2 °F (36.8 °C)      Temp src Oral      SpO2       Weight 270 lb (122.5 kg)      Height 5' 9.5\" (1.765 m)      Pain Score  0      Pain Location       Pain Education?       BSA (Calculated - m2) - Miguel & Miguel 2.36      BMI (Calculated) 39.3      Constitutional:  Alert, cooperative, oriented.  Mood and affect appropriate.  Appears close to chronological age.  Well nourished.  Well developed.  Psychiatric:  Alert and oriented times three.  Coherent speech.  Verbalizes understanding of our discussions today.      Labs and Imaging:  Labs are reviewed and are filed in the flow sheet.   MRI-brain (12/24/2019):  There is no evidence of metastatic disease.  PET-CT (12/27/2019):  Images were directly reviewed with the patient and her spouse.  IMPRESSION:   1. FDG avid pulmonary parenchymal, supraclavicular/intrathoracic/portacaval  lorena, and multiple osseous metastases. This includes large proximal right  femur (increasing chance of pathologic fracture) and multiple vertebral  body lesions.  2. Changes from right breast lumpectomy with right axillary lorena  dissection. Negative for local recurrence.    Recent Labs   Lab 12/17/19  1741 10/14/19  1012   WBC 8.5 9.3   RBC 4.24 4.34   HGB 11.7* 12.7   HCT 36.4 38.1   MCV 85.8 87.8    304   Absolute Neutrophil 5.4 6.0   Absolute Lymph 1.9 2.1   Absolute Mono 1.0* 1.0*   Absolute Eos 0.2 0.2   Absolute Baso 0.0 0.1     Recent Labs   Lab 12/17/19  1741 10/14/19  1012   Glucose 105* 101*   Sodium 142 136   Potassium 3.5 4.1   Chloride 101 99   Creatinine 0.72 0.65    CALCIUM 9.7 9.6   TOTAL PROTEIN 8.4* 8.0   Albumin 3.6 3.3*   AST/SGOT 14 18   ALK PHOSPHATASE 118* 114   ALT/SGPT 25 24     Recent Labs   Lab 12/17/19  1741          Bronchoscopy/EBUS:   Pathologic Diagnosis :                                  **FINAL DIAGNOSIS **         A, B: Trachea and right bronchus intermedius, endobronchial biopsies:     - Adenocarcinoma, compatible with primary pulmonary adenocarcinoma of acinar     type         Juan Carlos M.D.     ** Electronic Signature (GFN) 12/30/2019 09:27 **      Stain: TTF-1/Napsin A dual stain        -  Interpretation: Positive/Positive     Stain: JOSE LUIS-3        -  Interpretation: Negative     Stain: Mammaglobin        -  Interpretation: Negative     Stain: GCDFP 15        -  Interpretation: Negative      Estrogen Receptor ([place], SP1 clone)                         Patient Result: NEGATIVE            Elizabeth Score 0 (0 + 0); 0% of tumor nuclei, none                                                                                                                                           Progesterone Receptor ([place], 1E2 clone)           Patient Result: NEGATIVE            Elizabeth Score 0 (0 + 0); 0% of tumor nuclei, none                                                                                                                                                                           Her-2 Protein ([place] [employer], 4B5 clone)           Patient Result: NEGATIVE (1+)     IMPRESSION/PLAN:   Kelley Hoover is a 58 year old woman with a history of premenopausal (patient now postmenopausal) hormone receptor positive HER-2 non-overexpressing stage IB right breast cancer. She is now 8+ years out from diagnosis and initiation of curative intent multimodality therapy. She's been rendered surgically menopausal. She is on adjuvant Arimidex 1 mg daily. She is tolerating Arimidex well.     The patient has biopsy proven adenocarcinoma of the right upper lobe of  lung.  We are awaiting molecular profiling to determine systemic treatment options.  Patient has multiple sites of metastatic disease based on imaging.  There is a modest elevation of the CA 27-29 tumor marker.  For completeness sake I think it would be appropriate to obtain a bone biopsy to make sure there is not recurrent metastatic breast cancer concurrent with a new diagnosis of metastatic lung cancer.  I do not think that we could look at the right upper lobe lung cancer and consider stage III or less disease with everything else being explained by breast cancer.  Therefore I think her lung cancer is stage IV and either accounts for all evidence of disease or there is recurrent metastatic breast cancer in addition to stage IV lung cancer.  We discussed the dominant right femur lesion.  This may require surgical stabilization and if so we will obtain pathology material from that procedure.  I will ask Dr. Arora to review.  If surgery is not needed I do think it is worthwhile obtaining a bone biopsy.  In the meantime systemic treatment recommendations will depend on the pending molecular profiling of the tumor and PD L1 assay.  We discussed several treatment approaches.  The patient had a number of questions and these were all answered.    Discussed with patient the natural history, course and prognosis of illness.    Therapeutic options discussed and explained.  Side effects, risks and benefits, and alternatives discussed.  Patient acknowledges understanding of disease and treatment plan.    Plan:  1. Consult Dr. Juan Arora regarding right femur  2. If Orthopedic surgery is required we will obtain pathology from that procedure and follow the procedure with radiation therapy.  3. If Orthopedic surgery is not required we will obtain IR consult for bone biopsy and radiation oncology consult for radiation to the right proximal femur.  4. Team screen pending STMP and lung cancer fusion panel.    5. Extended  follow-up after STMP and lung cancer fusion panel available.  At that time we will discuss systemic therapy options.    Patient Care Team:  Mauricio Mckeon MD as PCP - General (Family Practice)  Emanuel Sutherland MD (Internal Medicine - Pulmonary Disease)  Gigi Arora MD (Orthopedic Surgery)         used

## 2020-01-29 ENCOUNTER — APPOINTMENT (OUTPATIENT)
Dept: ORTHOPEDIC SURGERY | Facility: CLINIC | Age: 80
End: 2020-01-29

## 2020-01-30 ENCOUNTER — APPOINTMENT (OUTPATIENT)
Dept: DERMATOLOGY | Facility: CLINIC | Age: 80
End: 2020-01-30
Payer: MEDICARE

## 2020-01-30 DIAGNOSIS — Z85.828 PERSONAL HISTORY OF OTHER MALIGNANT NEOPLASM OF SKIN: ICD-10-CM

## 2020-01-30 DIAGNOSIS — L71.0 PERIORAL DERMATITIS: ICD-10-CM

## 2020-01-30 PROCEDURE — 99213 OFFICE O/P EST LOW 20 MIN: CPT

## 2020-01-30 RX ORDER — METRONIDAZOLE 7.5 MG/G
0.75 GEL TOPICAL
Qty: 1 | Refills: 1 | Status: ACTIVE | COMMUNITY
Start: 2020-01-30 | End: 1900-01-01

## 2020-01-31 PROBLEM — Z85.828 HISTORY OF NONMELANOMA SKIN CANCER: Status: ACTIVE | Noted: 2020-01-31

## 2020-02-06 ENCOUNTER — APPOINTMENT (OUTPATIENT)
Dept: DERMATOLOGY | Facility: CLINIC | Age: 80
End: 2020-02-06
Payer: MEDICARE

## 2020-02-06 DIAGNOSIS — L57.8 OTHER SKIN CHANGES DUE TO CHRONIC EXPOSURE TO NONIONIZING RADIATION: ICD-10-CM

## 2020-02-06 PROCEDURE — 99213 OFFICE O/P EST LOW 20 MIN: CPT

## 2020-02-06 NOTE — PHYSICAL EXAM
[Oriented x 3] : ~L oriented x 3 [Alert] : alert [Nose] : Nose [Well Nourished] : well nourished [Ears] : Ears [Eyelids] : Eyelids [Neck] : Neck [FreeTextEntry3] : Colloid milium of the left lower lip.

## 2020-02-06 NOTE — HISTORY OF PRESENT ILLNESS
[FreeTextEntry1] : Fit in for lesion of the left lower lip. [de-identified] : Bump for a few weeks, with few others over the past months.  No bleeding, no tenderness.  No self tx.

## 2020-02-06 NOTE — ASSESSMENT
[FreeTextEntry1] : Colloid milium\par education.\par Try witchhazel daily over areas prone to milium.

## 2020-03-10 ENCOUNTER — APPOINTMENT (OUTPATIENT)
Dept: DERMATOLOGY | Facility: CLINIC | Age: 80
End: 2020-03-10
Payer: MEDICARE

## 2020-03-10 PROCEDURE — 99213 OFFICE O/P EST LOW 20 MIN: CPT

## 2020-03-10 RX ORDER — IBUPROFEN 600 MG/1
600 TABLET, FILM COATED ORAL
Qty: 20 | Refills: 0 | Status: ACTIVE | COMMUNITY
Start: 2020-01-27

## 2020-03-10 RX ORDER — HYDROQUINONE 40 MG/G
4 CREAM TOPICAL
Qty: 28 | Refills: 0 | Status: ACTIVE | COMMUNITY
Start: 2020-02-05

## 2020-03-10 RX ORDER — CHLORHEXIDINE GLUCONATE, 0.12% ORAL RINSE 1.2 MG/ML
0.12 SOLUTION DENTAL
Qty: 473 | Refills: 0 | Status: ACTIVE | COMMUNITY
Start: 2020-01-27

## 2020-03-10 RX ORDER — VALACYCLOVIR 1 G/1
1 TABLET, FILM COATED ORAL 3 TIMES DAILY
Qty: 21 | Refills: 0 | Status: ACTIVE | COMMUNITY
Start: 2020-03-10 | End: 1900-01-01

## 2020-03-10 RX ORDER — AMOXICILLIN 500 MG/1
500 CAPSULE ORAL
Qty: 20 | Refills: 0 | Status: ACTIVE | COMMUNITY
Start: 2020-01-27

## 2020-03-12 ENCOUNTER — APPOINTMENT (OUTPATIENT)
Dept: DERMATOLOGY | Facility: CLINIC | Age: 80
End: 2020-03-12
Payer: MEDICARE

## 2020-03-12 VITALS — WEIGHT: 140 LBS | HEIGHT: 61 IN | BODY MASS INDEX: 26.43 KG/M2

## 2020-03-12 DIAGNOSIS — B02.9 ZOSTER W/OUT COMPLICATIONS: ICD-10-CM

## 2020-03-12 PROCEDURE — 99213 OFFICE O/P EST LOW 20 MIN: CPT

## 2020-03-12 NOTE — HISTORY OF PRESENT ILLNESS
[FreeTextEntry1] : Fit in for zoster, with progression. [de-identified] : She notes the rash of the left neck has progressed to the left face.  She notes some discomfort and some itching, but neither symptom seem to be persistent or severe.  Although tenderness was her chief complaint last visit (2 days ago), it seems not to be significant currently.  She is s/p 2 days on valtrex.\par She does wear perfume, the same perfume for "20 years"), which she sprays on her neck and collar, using her right hand.

## 2020-03-12 NOTE — PHYSICAL EXAM
[Alert] : alert [Oriented x 3] : ~L oriented x 3 [Well Nourished] : well nourished [Nose] : Nose [Eyelids] : Eyelids [Lips] : Lips [Neck] : Neck [FreeTextEntry3] : Erythematous boggy papules and patches, with some skip regions of the left neck, left mandibular region, left posterior scalp, left pre-auricular region and left ear.\par It stops distinctly at the midline.\par There are no vesicles, crusts or excoriations.

## 2020-03-12 NOTE — ASSESSMENT
[FreeTextEntry1] : Zoster.\par I did consider a possible contact dermatitis, although the patient should experience more itching with a contact derm, she denies any contactants other than the perfume, and it involves the hair-baring skin of the left posterior scalp.  This is a dermatomal distribution.\par Continue valtrex 1 gm tid to complete 1 week.\par Will give prednisone 40mg qAM x 3 days only.\par Patient to call in 3-4 days with status.

## 2020-03-22 NOTE — PHYSICAL EXAM
[Alert] : alert [Oriented x 3] : ~L oriented x 3 [Well Nourished] : well nourished [Eyelids] : Eyelids [Ears] : Ears [Lips] : Lips [Neck] : Neck [FreeTextEntry3] : Clustered erythematous papules and macules of the left clavicle, left neck and left posterior inferior scalp.\par \par Small cystic papule of the left lower lip, vermillion border.

## 2020-03-22 NOTE — HISTORY OF PRESENT ILLNESS
[FreeTextEntry1] : Rash over the past couple of days. [de-identified] : Associated with tenderness over the past 12+ hours.  No bleeding, or weeping.   No prior tx.\par hx of Chicken pox many years ago, and shingles vaccine numerous years ago.

## 2020-03-22 NOTE — ASSESSMENT
[FreeTextEntry1] : Herpes zoster\par education.\par Valtrex 1 gm tid for 1 week.\par \par Cyst of the left lower lip.\par Plan punch excision.

## 2020-04-28 RX ORDER — HYDROCORTISONE BUTYRATE 1 MG/ML
0.1 SOLUTION TOPICAL
Qty: 1 | Refills: 2 | Status: ACTIVE | COMMUNITY
Start: 2020-03-26 | End: 1900-01-01

## 2020-06-16 ENCOUNTER — APPOINTMENT (OUTPATIENT)
Dept: RHEUMATOLOGY | Facility: CLINIC | Age: 80
End: 2020-06-16
Payer: MEDICARE

## 2020-06-16 VITALS
TEMPERATURE: 97.3 F | HEART RATE: 59 BPM | SYSTOLIC BLOOD PRESSURE: 120 MMHG | BODY MASS INDEX: 28.32 KG/M2 | DIASTOLIC BLOOD PRESSURE: 70 MMHG | OXYGEN SATURATION: 98 % | HEIGHT: 61 IN | WEIGHT: 150 LBS

## 2020-06-16 DIAGNOSIS — L40.50 ARTHROPATHIC PSORIASIS, UNSPECIFIED: ICD-10-CM

## 2020-06-16 DIAGNOSIS — M47.22 OTHER SPONDYLOSIS WITH RADICULOPATHY, CERVICAL REGION: ICD-10-CM

## 2020-06-16 DIAGNOSIS — L65.9 NONSCARRING HAIR LOSS, UNSPECIFIED: ICD-10-CM

## 2020-06-16 PROCEDURE — 99214 OFFICE O/P EST MOD 30 MIN: CPT | Mod: 25

## 2020-06-16 PROCEDURE — 36415 COLL VENOUS BLD VENIPUNCTURE: CPT

## 2020-06-16 PROCEDURE — 99204 OFFICE O/P NEW MOD 45 MIN: CPT | Mod: 25

## 2020-06-16 RX ORDER — DICLOFENAC SODIUM 10 MG/G
1 GEL TOPICAL
Qty: 3 | Refills: 3 | Status: ACTIVE | COMMUNITY
Start: 2020-06-16 | End: 1900-01-01

## 2020-06-16 RX ORDER — DOXYCYCLINE 100 MG/1
100 TABLET, FILM COATED ORAL TWICE DAILY
Qty: 14 | Refills: 0 | Status: DISCONTINUED | COMMUNITY
Start: 2020-01-30 | End: 2020-06-16

## 2020-06-16 RX ORDER — PREDNISONE 20 MG/1
20 TABLET ORAL
Qty: 6 | Refills: 0 | Status: DISCONTINUED | COMMUNITY
Start: 2020-03-12 | End: 2020-06-16

## 2020-06-16 NOTE — REVIEW OF SYSTEMS
[Recent Weight Gain (___ Lbs)] : recent [unfilled] ~Ulb weight gain [Dry Eyes] : dryness of the eyes [Joint Pain] : joint pain [Joint Stiffness] : joint stiffness [Negative] : Heme/Lymph [Fever] : no fever [Chills] : no chills [Feeling Poorly] : not feeling poorly [Feeling Tired] : not feeling tired [Recent Weight Loss (___ Lbs)] : no recent weight loss [Eye Pain] : no eye pain [Red Eyes] : eyes not red [Eyesight Problems] : no eyesight problems [Discharge From Eyes] : no purulent discharge from the eyes [Eyes Itch] : no itching of the eyes [Earache] : no earache [Loss Of Hearing] : no hearing loss [Nosebleeds] : no nosebleeds [Nasal Discharge] : no nasal discharge [Sore Throat] : no sore throat [Heart Rate Is Slow] : the heart rate was not slow [Hoarseness] : no hoarseness [Heart Rate Is Fast] : the heart rate was not fast [Chest Pain] : no chest pain [Palpitations] : no palpitations [Leg Claudication] : no intermittent leg claudication [Lower Ext Edema] : no lower extremity edema [Shortness Of Breath] : no shortness of breath [Cough] : no cough [Wheezing] : no wheezing [SOB on Exertion] : no shortness of breath during exertion [Orthopnea] : no orthopnea [PND] : no PND [Arthralgias] : no arthralgias [Joint Swelling] : no joint swelling [Limb Pain] : no limb pain [Limb Swelling] : no limb swelling [As Noted in HPI] : as noted in HPI [Skin Lesions] : skin lesion [FreeTextEntry3] : mild dry eyes/ mouth - no mucositis  [FreeTextEntry8] : urinary freq [FreeTextEntry2] : decreased activity  [de-identified] : nothing active now

## 2020-06-16 NOTE — CONSULT LETTER
[Dear  ___] : Dear  [unfilled], [Consult Letter:] : I had the pleasure of evaluating your patient, [unfilled]. [Consult Closing:] : Thank you very much for allowing me to participate in the care of this patient.  If you have any questions, please do not hesitate to contact me. [Please see my note below.] : Please see my note below. [Sincerely,] : Sincerely, [FreeTextEntry2] : Moise Mckeon MD [FreeTextEntry1] : Please see below for summary of  recent rheumatology evaluation and recommendations.\par \par Pt of Dr. Henao carried dx OA and PsA\par 81 yo wf with long hx of joint pain, hypothyroidism,  OA (b/l TKR) vs. 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 Francois- [place])\par Shingles severe L sided C 1-2 dermatome.. 2/20 still post herpetic neuralgia \par \par 1) OA:  cervical, diffuse hands, knees and feet. 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 Hx + FH/ personal hx psoriasis... \par Is not willing to consider tx at this time if present.. though if knee pain persists would suggest MRI to confirm enthesopathy.. 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 given age would avoid \par NOTE: \par + Psoriasis ys ago behind ears confirmed by Dr. Irwin, nothing recent. \par \par 3) Shingles severe L sided C 1-2 dermatome.. 2/20 still post herpetic neuralgia.  Should be vaccinated. \par \par 4) Bone density- reportedly nl improved w/ wt bearing activity:  known OP in past, last study 2 ys ago.. nl \par Fosamax in past. Not willing to consider tx at this point if necessary \par \par Plan:\par - topical NSAIDS\par - Labs to assess for systemic inflammation\par - offered IA injection which would tx GTB but also maybe L knee chronic pain.. but declined for now... \par - may need MRI in future L knee / and or consider SI xrays / [id number] \par - encouraged to return immediately if sx worsen, otherwise f/u 3 mn\par \par  [FreeTextEntry3] : Avelina Haas DNP, ANP-C\par Division or Rheumatology\par [facility]\par \par

## 2020-06-16 NOTE — ASSESSMENT
[FreeTextEntry1] : Pt of Dr. Henao carried dx OA and PsA\par 81 yo wf with long hx of joint pain, hypothyroidism,  OA (b/l TKR) vs. 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 Francois- [place])\par Shingles severe L sided C 1-2 dermatome.. 2/20 still post herpetic neuralgia \par \par 1) OA:  cervical, diffuse hands, knees and feet. 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 Hx + FH/ personal hx psoriasis... \par Is not willing to consider tx at this time if present.. though if knee pain persists would suggest MRI to confirm enthesopathy.. 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 given age would avoid \par NOTE: \par + Psoriasis ys ago behind ears confirmed by Dr. Irwin, nothing recent. \par \par 3) Shingles severe L sided C 1-2 dermatome.. 2/20 still post herpetic neuralgia.  Should be vaccinated. \par \par 4) Bone density- reportedly nl improved w/ wt bearing activity:  known OP in past, last study 2 ys ago.. nl \par Fosamax in past. Not willing to consider tx at this point if necessary \par \par Plan:\par - topical NSAIDS\par - Labs to assess for systemic inflammation\par - offered IA injection which would tx GTB but also maybe L knee chronic pain.. but declined for now... \par - may need MRI in future L knee / and or consider SI xrays / [id number] \par - encouraged to return immediately if sx worsen, otherwise f/u 3 mn\par \par

## 2020-06-16 NOTE — PHYSICAL EXAM
[General Appearance - In No Acute Distress] : in no acute distress [General Appearance - Alert] : alert [General Appearance - Well-Appearing] : healthy appearing [Sclera] : the sclera and conjunctiva were normal [Hearing Threshold Finger Rub Not [place]] : hearing was normal [Outer Ear] : the ears and nose were normal in appearance [Examination Of The Oral Cavity] : the lips and gums were normal [Neck Appearance] : the appearance of the neck was normal [Respiration, Rhythm And Depth] : normal respiratory rhythm and effort [Exaggerated Use Of Accessory Muscles For Inspiration] : no accessory muscle use [Auscultation Breath Sounds / Voice Sounds] : lungs were clear to auscultation bilaterally [Murmurs] : no murmurs [Heart Rate And Rhythm] : heart rate was normal and rhythm regular [Heart Sounds] : normal S1 and S2 [Edema] : there was no peripheral edema [Abdomen Tenderness] : non-tender [Bowel Sounds] : normal bowel sounds [Abdomen Soft] : soft [Abnormal Walk] : normal gait [Musculoskeletal - Swelling] : no joint swelling seen [Abdomen Mass (___ Cm)] : no abdominal mass palpated [] : no rash [Skin Color & Pigmentation] : normal skin color and pigmentation [Motor Exam] : the motor exam was normal [Impaired Insight] : insight and judgment were intact [Affect] : the affect was normal [General Appearance - Well Nourished] : well nourished [General Appearance - Well Developed] : well developed [Cervical Lymph Nodes Enlarged Posterior Bilaterally] : posterior cervical [Supraclavicular Lymph Nodes Enlarged Bilaterally] : supraclavicular [Cervical Lymph Nodes Enlarged Anterior Bilaterally] : anterior cervical [No CVA Tenderness] : no ~M costovertebral angle tenderness [No Spinal Tenderness] : no spinal tenderness [FreeTextEntry1] : limited neck ROM; L knee lacks 45 degrees w/ POM- slight warm, no redness or obvious effusion, mild TTT throughout; R knee lacks 10-20 degrees full flexion but no POM; hips nearly full b/l  +++ 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

## 2020-06-18 DIAGNOSIS — R91.8 OTHER NONSPECIFIC ABNORMAL FINDING OF LUNG FIELD: ICD-10-CM

## 2020-06-18 LAB
ALBUMIN SERPL ELPH-MCNC: 4.1 G/DL
ALP BLD-CCNC: 60 U/L
ALT SERPL-CCNC: 13 U/L
ANION GAP SERPL CALC-SCNC: 12 MMOL/L
AST SERPL-CCNC: 14 U/L
BASOPHILS # BLD AUTO: 0.06 K/UL
BASOPHILS NFR BLD AUTO: 1 %
BILIRUB SERPL-MCNC: 0.3 MG/DL
BUN SERPL-MCNC: 15 MG/DL
CALCIUM SERPL-MCNC: 9.5 MG/DL
CHLORIDE SERPL-SCNC: 106 MMOL/L
CO2 SERPL-SCNC: 24 MMOL/L
CREAT SERPL-MCNC: 0.64 MG/DL
CRP SERPL-MCNC: 0.17 MG/DL
EOSINOPHIL # BLD AUTO: 0.18 K/UL
EOSINOPHIL NFR BLD AUTO: 3 %
ERYTHROCYTE [SEDIMENTATION RATE] IN BLOOD BY WESTERGREN METHOD: 32 MM/HR
GLUCOSE SERPL-MCNC: 93 MG/DL
HBV CORE IGG+IGM SER QL: NONREACTIVE
HBV SURFACE AB SER QL: NONREACTIVE
HBV SURFACE AG SER QL: NONREACTIVE
HCT VFR BLD CALC: 40.4 %
HCV AB SER QL: NONREACTIVE
HCV S/CO RATIO: 0.13 S/CO
HGB BLD-MCNC: 13 G/DL
IMM GRANULOCYTES NFR BLD AUTO: 0.3 %
LYMPHOCYTES # BLD AUTO: 1.71 K/UL
LYMPHOCYTES NFR BLD AUTO: 28.4 %
M TB IFN-G BLD-IMP: NEGATIVE
MAN DIFF?: NORMAL
MCHC RBC-ENTMCNC: 28.2 PG
MCHC RBC-ENTMCNC: 32.2 GM/DL
MCV RBC AUTO: 87.6 FL
MONOCYTES # BLD AUTO: 0.5 K/UL
MONOCYTES NFR BLD AUTO: 8.3 %
NEUTROPHILS # BLD AUTO: 3.55 K/UL
NEUTROPHILS NFR BLD AUTO: 59 %
PLATELET # BLD AUTO: 274 K/UL
POTASSIUM SERPL-SCNC: 4.5 MMOL/L
PROT SERPL-MCNC: 6.3 G/DL
QUANTIFERON TB PLUS MITOGEN MINUS NIL: 5.5 IU/ML
QUANTIFERON TB PLUS NIL: 0.01 IU/ML
QUANTIFERON TB PLUS TB1 MINUS NIL: 0 IU/ML
QUANTIFERON TB PLUS TB2 MINUS NIL: 0 IU/ML
RBC # BLD: 4.61 M/UL
RBC # FLD: 13.2 %
SODIUM SERPL-SCNC: 142 MMOL/L
WBC # FLD AUTO: 6.02 K/UL

## 2020-06-23 LAB
SARS-COV-2 IGG SERPL IA-ACNC: 0.01 INDEX
SARS-COV-2 IGG SERPL QL IA: NEGATIVE

## 2020-08-18 ENCOUNTER — APPOINTMENT (OUTPATIENT)
Dept: DERMATOLOGY | Facility: CLINIC | Age: 80
End: 2020-08-18

## 2020-10-26 ENCOUNTER — RX RENEWAL (OUTPATIENT)
Age: 80
End: 2020-10-26

## 2020-10-26 RX ORDER — GABAPENTIN 100 MG/1
100 CAPSULE ORAL 3 TIMES DAILY
Qty: 90 | Refills: 0 | Status: ACTIVE | COMMUNITY
Start: 2020-03-16 | End: 1900-01-01

## 2020-11-18 ENCOUNTER — APPOINTMENT (OUTPATIENT)
Dept: RHEUMATOLOGY | Facility: CLINIC | Age: 80
End: 2020-11-18

## 2020-12-16 PROBLEM — Z87.09 HISTORY OF ACUTE PHARYNGITIS: Status: RESOLVED | Noted: 2018-05-22 | Resolved: 2020-12-16

## 2021-01-12 ENCOUNTER — APPOINTMENT (OUTPATIENT)
Dept: RHEUMATOLOGY | Facility: CLINIC | Age: 81
End: 2021-01-12
Payer: MEDICARE

## 2021-01-12 VITALS
DIASTOLIC BLOOD PRESSURE: 80 MMHG | SYSTOLIC BLOOD PRESSURE: 110 MMHG | HEIGHT: 60 IN | WEIGHT: 159 LBS | BODY MASS INDEX: 31.22 KG/M2 | OXYGEN SATURATION: 97 % | HEART RATE: 70 BPM | TEMPERATURE: 97.6 F

## 2021-01-12 DIAGNOSIS — Z96.653 PAIN IN RIGHT KNEE: ICD-10-CM

## 2021-01-12 DIAGNOSIS — M25.562 PAIN IN RIGHT KNEE: ICD-10-CM

## 2021-01-12 DIAGNOSIS — G89.29 PAIN IN RIGHT KNEE: ICD-10-CM

## 2021-01-12 DIAGNOSIS — M25.561 PAIN IN RIGHT KNEE: ICD-10-CM

## 2021-01-12 DIAGNOSIS — L40.50 ARTHROPATHIC PSORIASIS, UNSPECIFIED: ICD-10-CM

## 2021-01-12 DIAGNOSIS — G89.28 PAIN IN RIGHT KNEE: ICD-10-CM

## 2021-01-12 DIAGNOSIS — M17.0 BILATERAL PRIMARY OSTEOARTHRITIS OF KNEE: ICD-10-CM

## 2021-01-12 PROCEDURE — 99213 OFFICE O/P EST LOW 20 MIN: CPT

## 2021-01-12 NOTE — HISTORY OF PRESENT ILLNESS
[FreeTextEntry1] : 1/12/21 \par - overall stable, L sided CVA mild tenderness- benign mild intermittent not sustained or associated w/ fevers, chills, malaise . \par - knee pain #1 issue but only with stairs but otherwise no impact on routine function\par - rare use topical diclofenac pan\par - no cardiopulmonary sx. otherwise doing well.. \par \par 1) OA\par 2) BOOP\par 3) OP\par _________________________________________________________________________________\par \par (Initial HPI 6/16/20 \par Previous pt of Dr. Morel's carried dx PsA and OA\par 81 yo wf with long hx of joint pain OA (b/l TKR) vs. PsA (+ FH mother severe psoriasis) with intermittent episodes of GTB b/l (given injections in past, severe last wk but better today.. ), and L knee pain s/p replacement but recurrent warmth/ swelling and pain around knee.  No overt effusions. \par + Psoriasis ys ago behind ears confirmed by Dr. Irwin, nothing recent. 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 denies fevers, chills, night sweats, lymphadenopathy, , rash, alopecia, raynauds, headaches, vision loss, sicca, mucositis, serositis, cp, sob, cough, GERD, n/v/d/c or abd bloating, bleeding/ clotting dyscrasias or cytopenias, paresthesias or weakness, renal or hepatic dysfunction. \par \par - Bone density:  known OP in past, last study 2 ys ago.. nl \par Fosamax in past.\par \par

## 2021-01-12 NOTE — PHYSICAL EXAM
[General Appearance - Alert] : alert [General Appearance - Well Nourished] : well nourished [General Appearance - In No Acute Distress] : in no acute distress [General Appearance - Well Developed] : well developed [General Appearance - Well-Appearing] : healthy appearing [Sclera] : the sclera and conjunctiva were normal [Hearing Threshold Finger Rub Not [place]] : hearing was normal [Outer Ear] : the ears and nose were normal in appearance [Examination Of The Oral Cavity] : the lips and gums were normal [Neck Appearance] : the appearance of the neck was normal [Respiration, Rhythm And Depth] : normal respiratory rhythm and effort [Exaggerated Use Of Accessory Muscles For Inspiration] : no accessory muscle use [Auscultation Breath Sounds / Voice Sounds] : lungs were clear to auscultation bilaterally [Heart Rate And Rhythm] : heart rate was normal and rhythm regular [Heart Sounds] : normal S1 and S2 [Murmurs] : no murmurs [Edema] : there was no peripheral edema [Cervical Lymph Nodes Enlarged Posterior Bilaterally] : posterior cervical [Cervical Lymph Nodes Enlarged Anterior Bilaterally] : anterior cervical [Supraclavicular Lymph Nodes Enlarged Bilaterally] : supraclavicular [No CVA Tenderness] : no ~M costovertebral angle tenderness [No Spinal Tenderness] : no spinal tenderness [Abnormal Walk] : normal gait [Musculoskeletal - Swelling] : no joint swelling seen [Skin Color & Pigmentation] : normal skin color and pigmentation [] : no rash [Motor Exam] : the motor exam was normal [Impaired Insight] : insight and judgment were intact [Affect] : the affect was normal [FreeTextEntry1] : limited neck ROM; L knee lacks 45 degrees w/ POM- no warmth, no redness or obvious effusion, mild TTT throughout; R knee lacks 10-20 degrees full flexion but no POM; hips nearly full b/l  +++ 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

## 2021-01-12 NOTE — ASSESSMENT
[FreeTextEntry1] : \par 81 yo wf with long hx of joint pain, hypothyroidism,  OA (b/l TKR) vs. 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 Francois- [place])\par Shingles severe L sided C 1-2 dermatome.. 2/20 still post herpetic neuralgia \par (previous pt of Dr. Morel)\par \par 1) OA:  cervical, diffuse hands, knees and feet. Doing well at this time w/ no severe discomfort\par Knees:  b/l TKR L chronic pain ever since worse w/ stairs. No recent  PT.. would like to consider now.. 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 Hx + FH/ personal hx psoriasis... \par Is not willing to consider tx at this time if present.. though if knee pain persists would suggest MRI to confirm enthesopathy.. 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 given age would avoid \par NOTE: \par + Psoriasis ys ago behind ears confirmed by Dr. Irwin, nothing recent. \par \par 3) Shingles severe L sided C 1-2 dermatome.. 2/20 still post herpetic neuralgia.  Should be vaccinated. \par \par 4) Bone density- reportedly nl improved w/ wt bearing activity:  known OP in past, last study 2 ys ago.. nl \par Fosamax in past. Not willing to consider tx at this point if necessary \par \par Plan:\par - topical NSAIDS\par - offered IA injection which would tx GTB but also maybe L knee chronic pain.. but declined for now... \par - may need MRI in future L knee / and or consider SI xrays / [id number]\par - start PT now  \par - encouraged to return immediately if sx worsen, otherwise f/u 3 mn\par \par

## 2021-01-12 NOTE — REVIEW OF SYSTEMS
[Recent Weight Gain (___ Lbs)] : recent [unfilled] ~Ulb weight gain [Dry Eyes] : dryness of the eyes [Joint Pain] : joint pain [Joint Stiffness] : joint stiffness [As Noted in HPI] : as noted in HPI [Skin Lesions] : skin lesion [Negative] : Heme/Lymph [Fever] : no fever [Chills] : no chills [Feeling Poorly] : not feeling poorly [Feeling Tired] : not feeling tired [Recent Weight Loss (___ Lbs)] : no recent weight loss [Eye Pain] : no eye pain [Red Eyes] : eyes not red [Eyesight Problems] : no eyesight problems [Discharge From Eyes] : no purulent discharge from the eyes [Eyes Itch] : no itching of the eyes [Earache] : no earache [Loss Of Hearing] : no hearing loss [Nosebleeds] : no nosebleeds [Nasal Discharge] : no nasal discharge [Sore Throat] : no sore throat [Hoarseness] : no hoarseness [Heart Rate Is Slow] : the heart rate was not slow [Heart Rate Is Fast] : the heart rate was not fast [Chest Pain] : no chest pain [Palpitations] : no palpitations [Leg Claudication] : no intermittent leg claudication [Lower Ext Edema] : no lower extremity edema [Shortness Of Breath] : no shortness of breath [Wheezing] : no wheezing [Cough] : no cough [SOB on Exertion] : no shortness of breath during exertion [Orthopnea] : no orthopnea [PND] : no PND [Arthralgias] : no arthralgias [Joint Swelling] : no joint swelling [Limb Pain] : no limb pain [Limb Swelling] : no limb swelling [FreeTextEntry2] : decreased activity  [FreeTextEntry3] : mild dry eyes/ mouth - no mucositis  [FreeTextEntry8] : urinary freq [de-identified] : nothing active now

## 2021-06-07 ENCOUNTER — APPOINTMENT (OUTPATIENT)
Dept: OBGYN | Facility: CLINIC | Age: 81
End: 2021-06-07
Payer: MEDICARE

## 2021-06-07 VITALS — SYSTOLIC BLOOD PRESSURE: 120 MMHG | DIASTOLIC BLOOD PRESSURE: 82 MMHG

## 2021-06-07 PROCEDURE — 99213 OFFICE O/P EST LOW 20 MIN: CPT

## 2021-11-17 NOTE — ASU PREOP CHECKLIST - AS BP NONINV METHOD
Med history complete, reviewed medications and allergies with patient and confirmed medication list with doctor first med profile, patient able to provide limited data with prompting, all medication related questions answered
electronic

## 2021-11-19 ENCOUNTER — APPOINTMENT (OUTPATIENT)
Dept: OBGYN | Facility: CLINIC | Age: 81
End: 2021-11-19
Payer: MEDICARE

## 2021-11-19 VITALS
HEIGHT: 60 IN | BODY MASS INDEX: 29.45 KG/M2 | DIASTOLIC BLOOD PRESSURE: 80 MMHG | SYSTOLIC BLOOD PRESSURE: 124 MMHG | WEIGHT: 150 LBS

## 2021-11-19 DIAGNOSIS — Z00.00 ENCOUNTER FOR GENERAL ADULT MEDICAL EXAMINATION W/OUT ABNORMAL FINDINGS: ICD-10-CM

## 2021-11-19 DIAGNOSIS — M85.80 OTHER SPECIFIED DISORDERS OF BONE DENSITY AND STRUCTURE, UNSPECIFIED SITE: ICD-10-CM

## 2021-11-19 PROCEDURE — G0328 FECAL BLOOD SCRN IMMUNOASSAY: CPT | Mod: QW

## 2021-11-19 PROCEDURE — G0101: CPT | Mod: GA

## 2021-11-23 LAB — HPV HIGH+LOW RISK DNA PNL CVX: NOT DETECTED

## 2021-11-29 LAB — CYTOLOGY CVX/VAG DOC THIN PREP: ABNORMAL

## 2022-03-08 ENCOUNTER — APPOINTMENT (OUTPATIENT)
Dept: OBGYN | Facility: CLINIC | Age: 82
End: 2022-03-08
Payer: MEDICARE

## 2022-03-08 ENCOUNTER — NON-APPOINTMENT (OUTPATIENT)
Age: 82
End: 2022-03-08

## 2022-03-08 VITALS — SYSTOLIC BLOOD PRESSURE: 126 MMHG | DIASTOLIC BLOOD PRESSURE: 82 MMHG

## 2022-03-08 DIAGNOSIS — N36.2 URETHRAL CARUNCLE: ICD-10-CM

## 2022-03-08 PROCEDURE — 99214 OFFICE O/P EST MOD 30 MIN: CPT

## 2022-04-20 ENCOUNTER — ASOB RESULT (OUTPATIENT)
Age: 82
End: 2022-04-20

## 2022-04-20 ENCOUNTER — APPOINTMENT (OUTPATIENT)
Dept: OBGYN | Facility: CLINIC | Age: 82
End: 2022-04-20
Payer: MEDICARE

## 2022-04-20 VITALS — DIASTOLIC BLOOD PRESSURE: 74 MMHG | SYSTOLIC BLOOD PRESSURE: 108 MMHG

## 2022-04-20 DIAGNOSIS — N89.8 OTHER SPECIFIED COMPLICATION OF GENITOURINARY PROSTHETIC DEVICES, IMPLANTS AND GRAFTS, INITIAL ENCOUNTER: ICD-10-CM

## 2022-04-20 DIAGNOSIS — T83.89XA OTHER SPECIFIED COMPLICATION OF GENITOURINARY PROSTHETIC DEVICES, IMPLANTS AND GRAFTS, INITIAL ENCOUNTER: ICD-10-CM

## 2022-04-20 PROCEDURE — 99213 OFFICE O/P EST LOW 20 MIN: CPT

## 2022-04-20 PROCEDURE — 76830 TRANSVAGINAL US NON-OB: CPT

## 2022-04-20 PROCEDURE — 77080 DXA BONE DENSITY AXIAL: CPT

## 2022-04-20 NOTE — PLAN
[FreeTextEntry1] : 81 year old female PM pt presents for pessary change and review of pelvic sono and DEXA \par pessary erosions in vagina, pmpb, ec 4.08mm\par \par pessary\par Pessary ring removed \par However, vagina irritated and advised pessary to not be reinserted\par Rx given for estrogen cream for twice a week for 2 weeks \par \par Advised to schedule endo biopsy at next visit \par RTO in 3-4 weeks for follow up for pessary change and endo bx \par \par dexa osteoporosis better, declines endocrine\par pelvic sono reveiwed\par 25 min face to face\par \par

## 2022-04-20 NOTE — PHYSICAL EXAM

## 2022-04-20 NOTE — HISTORY OF PRESENT ILLNESS
[FreeTextEntry1] : 81 year old pt presents for pessary change and review of pelvic sono and DEXA\par \par  LMP  presents for an annual gyn exam, PMH atrophy, 3rd degree uterine prolapse and cystocele, fibroid\par \par Pt has had no recent bleeding episode however, her first bleeding episode was once in 2021. \par \par Pt's vaginal is irritated from pessary which is causing vaginal bleeding. \par \par pelvic sono today: \par small fibroids \par endometrium normal 4.08 mmm \par both ovaries normal \par \par DEXA today: \par osteoporosis \par  [Mammogramdate] : 7/2021 [ColonoscopyDate] : 2021

## 2022-04-25 RX ORDER — ESTRADIOL 0.1 MG/G
0.1 CREAM VAGINAL
Qty: 1 | Refills: 0 | Status: ACTIVE | COMMUNITY
Start: 2022-04-22 | End: 1900-01-01

## 2022-05-10 ENCOUNTER — APPOINTMENT (OUTPATIENT)
Dept: OBGYN | Facility: CLINIC | Age: 82
End: 2022-05-10
Payer: MEDICARE

## 2022-05-10 VITALS — SYSTOLIC BLOOD PRESSURE: 134 MMHG | DIASTOLIC BLOOD PRESSURE: 82 MMHG

## 2022-05-10 DIAGNOSIS — Z87.42 PERSONAL HISTORY OF OTHER DISEASES OF THE FEMALE GENITAL TRACT: ICD-10-CM

## 2022-05-10 DIAGNOSIS — N95.0 POSTMENOPAUSAL BLEEDING: ICD-10-CM

## 2022-05-10 PROCEDURE — 58100 BIOPSY OF UTERUS LINING: CPT

## 2022-05-19 LAB — CORE LAB BIOPSY: NORMAL

## 2022-05-23 ENCOUNTER — NON-APPOINTMENT (OUTPATIENT)
Age: 82
End: 2022-05-23

## 2022-06-13 ENCOUNTER — APPOINTMENT (OUTPATIENT)
Dept: OBGYN | Facility: CLINIC | Age: 82
End: 2022-06-13
Payer: MEDICARE

## 2022-06-13 ENCOUNTER — ASOB RESULT (OUTPATIENT)
Age: 82
End: 2022-06-13

## 2022-06-13 PROCEDURE — 58340 CATHETER FOR HYSTEROGRAPHY: CPT

## 2022-06-13 PROCEDURE — 76831 ECHO EXAM UTERUS: CPT

## 2022-06-18 ENCOUNTER — NON-APPOINTMENT (OUTPATIENT)
Age: 82
End: 2022-06-18

## 2023-03-02 ENCOUNTER — APPOINTMENT (OUTPATIENT)
Dept: OBGYN | Facility: CLINIC | Age: 83
End: 2023-03-02
Payer: MEDICARE

## 2023-03-02 VITALS
SYSTOLIC BLOOD PRESSURE: 124 MMHG | DIASTOLIC BLOOD PRESSURE: 81 MMHG | BODY MASS INDEX: 28.66 KG/M2 | HEIGHT: 60 IN | WEIGHT: 146 LBS

## 2023-03-02 DIAGNOSIS — M81.0 AGE-RELATED OSTEOPOROSIS W/OUT CURRENT PATHOLOGICAL FRACTURE: ICD-10-CM

## 2023-03-02 DIAGNOSIS — Z96.0 PRESENCE OF UROGENITAL IMPLANTS: ICD-10-CM

## 2023-03-02 DIAGNOSIS — N81.9 FEMALE GENITAL PROLAPSE, UNSPECIFIED: ICD-10-CM

## 2023-03-02 DIAGNOSIS — Z46.89 ENCOUNTER FOR FITTING AND ADJUSTMENT OF OTHER SPECIFIED DEVICES: ICD-10-CM

## 2023-03-02 DIAGNOSIS — Z01.411 ENCOUNTER FOR GYNECOLOGICAL EXAMINATION (GENERAL) (ROUTINE) WITH ABNORMAL FINDINGS: ICD-10-CM

## 2023-03-02 DIAGNOSIS — D21.9 BENIGN NEOPLASM OF CONNECTIVE AND OTHER SOFT TISSUE, UNSPECIFIED: ICD-10-CM

## 2023-03-02 DIAGNOSIS — N90.5 ATROPHY OF VULVA: ICD-10-CM

## 2023-03-02 PROCEDURE — G0101: CPT | Mod: GA

## 2023-03-02 PROCEDURE — G0328 FECAL BLOOD SCRN IMMUNOASSAY: CPT | Mod: GA,QW

## 2023-03-02 NOTE — PLAN
[FreeTextEntry1] : 82 year old female presents for routine gyn exam - atrophy, 3rd degree uterine prolapse and cystocele, fibroid, osteoporosis, Hysteroscopy polypectomy in 4/09\par BSE taught\par Breast and pelvic exam performed\par 7/22  mammogram. Rx given, due in 7/23\par 4/22 Pelvic sono showed normal uterus with evidence of small myomas.Repeat 4/23\par \par POP\par 3rd degree uterine prolapse and cystocele \par Ring removed, cleaned and reinserted  \par Continue to follow up w/ Dr. Grijalva\par C/w Azo \par \par Osteoporosis:\par 4/22 DEXA bone density. Rx given, due in 4/24\par D/w pt Calcium 500 mg and Vitamin D 1000U intake, as well as weight-bearing exercise, such as walking, for 30 min daily\par Advised pt to continue to follow up w/ Rheumatologist for osteoporosis\par  \par RTO in 3 months f/u for pessary change or PRN

## 2023-03-02 NOTE — PHYSICAL EXAM
[Chaperone Present] : A chaperone was present in the examining room during all aspects of the physical examination [Appropriately responsive] : appropriately responsive [Alert] : alert [No Acute Distress] : no acute distress [No Lymphadenopathy] : no lymphadenopathy [Regular Rate Rhythm] : regular rate rhythm [No Murmurs] : no murmurs [Clear to Auscultation B/L] : clear to auscultation bilaterally [Soft] : soft [Non-tender] : non-tender [Non-distended] : non-distended [No HSM] : No HSM [No Lesions] : no lesions [No Mass] : no mass [Oriented x3] : oriented x3 [Examination Of The Breasts] : a normal appearance [No Masses] : no breast masses were palpable [Vulvar Atrophy] : vulvar atrophy [Labia Majora] : normal [Labia Minora] : normal [Atrophy] : atrophy [Cystocele] : a cystocele [Uterine Prolapse] : uterine prolapse [Normal] : normal [Uterine Adnexae] : normal [FreeTextEntry4] : 3rd degree uterine prolapse and cystocele [FreeTextEntry9] : Guaiac negative. No masses noted.

## 2023-03-02 NOTE — HISTORY OF PRESENT ILLNESS
[Patient reported mammogram was normal] : Patient reported mammogram was normal [Patient reported bone density results were abnormal] : Patient reported bone density results were abnormal [Patient reported colonoscopy was normal] : Patient reported colonoscopy was normal [FreeTextEntry1] : 2023. CASSANDRA NAGY 82 year old female  LMP . She presents for an annual gyn exam \par \par She feels well and offers no complaints. She uses a pessary for POP with no complaints. She denies vaginal bleeding, abnormal vaginal discharge or vaginitis sxs. No urinary complaints. BM is normal per patient, no bloody stool. She denies abdominal and pelvic pain.\par \par She followed up w/ Dr. Grijalva who advised her to take Azo. Pt follows up w/ Rheumatologist for bone health. No new medical conditions, medications or surgeries.\par \par GynHx: atrophy, 3rd degree uterine prolapse and cystocele, fibroid, osteoporosis \par PMH: Hypothyroid, arthritis, pneumonitis\par SHx: Hysteroscopy polypectomy in , Moh's, cataracts in 2017, L knee replacement\par Allergies: NKDA\par Meds: Synthroid\par FHx: Denies FHx of breast, ovarian, uterine or colon cancer.  [TextBox_4] : Pelvic sono-4/22- ec lining 4.8 mm, normal ovaries, normal uterus with evidence of small myomas.\par SHG 6/22- no polyps visualized, adjacent adenomyosis noted [Mammogramdate] : 7/22 [BoneDensityDate] : 4/22 [TextBox_37] : osteoporosis  [ColonoscopyDate] : 2021

## 2023-03-06 ENCOUNTER — OFFICE (OUTPATIENT)
Dept: URBAN - METROPOLITAN AREA CLINIC 112 | Facility: CLINIC | Age: 83
Setting detail: OPHTHALMOLOGY
End: 2023-03-06
Payer: MEDICARE

## 2023-03-06 DIAGNOSIS — H11.32: ICD-10-CM

## 2023-03-06 DIAGNOSIS — H16.223: ICD-10-CM

## 2023-03-06 PROCEDURE — 99213 OFFICE O/P EST LOW 20 MIN: CPT | Performed by: OPHTHALMOLOGY

## 2023-03-06 ASSESSMENT — REFRACTION_AUTOREFRACTION
OS_AXIS: 071
OS_CYLINDER: -0.75
OD_SPHERE: -1.00
OD_CYLINDER: -0.25
OD_AXIS: 099
OS_SPHERE: -0.50

## 2023-03-06 ASSESSMENT — REFRACTION_MANIFEST
OD_SPHERE: -0.25
OD_VA1: 20/20
OD_CYLINDER: SPHERE

## 2023-03-06 ASSESSMENT — VISUAL ACUITY
OS_BCVA: 20/20-1
OD_BCVA: 20/20-1

## 2023-03-06 ASSESSMENT — REFRACTION_CURRENTRX
OD_CYLINDER: -1.00
OS_OVR_VA: 20/
OS_VPRISM_DIRECTION: SV
OS_SPHERE: +4.50
OD_AXIS: 087
OD_CYLINDER: -1.00
OS_OVR_VA: 20/
OS_AXIS: 070
OD_VPRISM_DIRECTION: SV
OS_SPHERE: +2.25
OD_SPHERE: +5.00
OD_SPHERE: +2.25
OS_VPRISM_DIRECTION: SV
OD_CYLINDER: -1.00
OS_OVR_VA: 20/
OS_AXIS: 103
OD_OVR_VA: 20/
OD_SPHERE: +4.50
OD_VPRISM_DIRECTION: SV
OD_OVR_VA: 20/
OS_CYLINDER: -0.25
OS_CYLINDER: -0.50
OD_AXIS: 088
OD_OVR_VA: 20/
OS_AXIS: 084
OD_AXIS: 077
OS_CYLINDER: -0.75
OS_SPHERE: +4.25

## 2023-03-06 ASSESSMENT — KERATOMETRY
OD_AXISANGLE_DEGREES: 101
OD_K1POWER_DIOPTERS: 42.75
OS_K2POWER_DIOPTERS: 44.50
OS_AXISANGLE_DEGREES: 092
METHOD_AUTO_MANUAL: AUTO
OS_K1POWER_DIOPTERS: 43.25
OD_K2POWER_DIOPTERS: 43.50

## 2023-03-06 ASSESSMENT — AXIALLENGTH_DERIVED
OS_AL: 23.7968
OD_AL: 24.1824

## 2023-03-06 ASSESSMENT — TONOMETRY
OS_IOP_MMHG: 16
OD_IOP_MMHG: 15

## 2023-03-06 ASSESSMENT — SPHEQUIV_DERIVED
OS_SPHEQUIV: -0.875
OD_SPHEQUIV: -1.125

## 2023-03-06 ASSESSMENT — SUPERFICIAL PUNCTATE KERATITIS (SPK)
OS_SPK: T
OD_SPK: T

## 2023-03-06 ASSESSMENT — CONFRONTATIONAL VISUAL FIELD TEST (CVF)
OD_FINDINGS: FULL
OS_FINDINGS: FULL

## 2023-03-06 ASSESSMENT — LID EXAM ASSESSMENTS
OD_BLEPHARITIS: RUL 1+
OS_BLEPHARITIS: LUL 1+

## 2023-03-09 ENCOUNTER — OFFICE (OUTPATIENT)
Dept: URBAN - METROPOLITAN AREA CLINIC 112 | Facility: CLINIC | Age: 83
Setting detail: OPHTHALMOLOGY
End: 2023-03-09
Payer: MEDICARE

## 2023-03-09 DIAGNOSIS — H01.012: ICD-10-CM

## 2023-03-09 DIAGNOSIS — H01.015: ICD-10-CM

## 2023-03-09 DIAGNOSIS — H11.32: ICD-10-CM

## 2023-03-09 DIAGNOSIS — H16.223: ICD-10-CM

## 2023-03-09 DIAGNOSIS — H47.233: ICD-10-CM

## 2023-03-09 DIAGNOSIS — H01.014: ICD-10-CM

## 2023-03-09 DIAGNOSIS — H01.011: ICD-10-CM

## 2023-03-09 PROCEDURE — 99213 OFFICE O/P EST LOW 20 MIN: CPT | Performed by: OPHTHALMOLOGY

## 2023-03-09 ASSESSMENT — SUPERFICIAL PUNCTATE KERATITIS (SPK)
OD_SPK: T
OS_SPK: T

## 2023-03-09 ASSESSMENT — REFRACTION_AUTOREFRACTION
OD_SPHERE: -1.00
OS_CYLINDER: -0.75
OS_SPHERE: -0.50
OD_AXIS: 099
OD_CYLINDER: -0.25
OS_AXIS: 071

## 2023-03-09 ASSESSMENT — KERATOMETRY
OS_AXISANGLE_DEGREES: 092
OD_K2POWER_DIOPTERS: 43.50
METHOD_AUTO_MANUAL: AUTO
OS_K1POWER_DIOPTERS: 43.25
OS_K2POWER_DIOPTERS: 44.50
OD_AXISANGLE_DEGREES: 101
OD_K1POWER_DIOPTERS: 42.75

## 2023-03-09 ASSESSMENT — REFRACTION_MANIFEST
OD_SPHERE: -0.25
OD_VA1: 20/20
OD_CYLINDER: SPHERE

## 2023-03-09 ASSESSMENT — REFRACTION_CURRENTRX
OD_OVR_VA: 20/
OD_AXIS: 088
OS_SPHERE: +4.25
OD_VPRISM_DIRECTION: SV
OS_OVR_VA: 20/
OS_CYLINDER: -0.75
OS_AXIS: 103
OD_SPHERE: +5.00
OS_VPRISM_DIRECTION: SV
OS_OVR_VA: 20/
OS_SPHERE: +4.50
OS_CYLINDER: -0.25
OS_SPHERE: +2.25
OD_OVR_VA: 20/
OS_VPRISM_DIRECTION: SV
OD_CYLINDER: -1.00
OD_VPRISM_DIRECTION: SV
OS_AXIS: 084
OD_OVR_VA: 20/
OD_SPHERE: +4.50
OD_CYLINDER: -1.00
OD_AXIS: 077
OD_AXIS: 087
OS_AXIS: 070
OS_CYLINDER: -0.50
OD_CYLINDER: -1.00
OS_OVR_VA: 20/
OD_SPHERE: +2.25

## 2023-03-09 ASSESSMENT — VISUAL ACUITY
OD_BCVA: 20/20
OS_BCVA: 20/20

## 2023-03-09 ASSESSMENT — SPHEQUIV_DERIVED
OD_SPHEQUIV: -1.125
OS_SPHEQUIV: -0.875

## 2023-03-09 ASSESSMENT — CONFRONTATIONAL VISUAL FIELD TEST (CVF)
OD_FINDINGS: FULL
OS_FINDINGS: FULL

## 2023-03-09 ASSESSMENT — LID EXAM ASSESSMENTS
OS_BLEPHARITIS: LUL 1+
OD_BLEPHARITIS: RUL 1+

## 2023-03-09 ASSESSMENT — TONOMETRY
OS_IOP_MMHG: 16
OD_IOP_MMHG: 16

## 2023-03-09 ASSESSMENT — AXIALLENGTH_DERIVED
OS_AL: 23.7968
OD_AL: 24.1824

## 2023-03-10 PROBLEM — H11.32 SUB-CONJUNCTIVAL HEMORRHAGE; LEFT EYE: Status: ACTIVE | Noted: 2023-03-06

## 2023-03-10 PROBLEM — H16.223 DRY EYE SYNDROME K SICCA; BOTH EYES: Status: ACTIVE | Noted: 2023-03-06

## 2023-04-13 ENCOUNTER — OFFICE (OUTPATIENT)
Dept: URBAN - METROPOLITAN AREA CLINIC 112 | Facility: CLINIC | Age: 83
Setting detail: OPHTHALMOLOGY
End: 2023-04-13
Payer: MEDICARE

## 2023-04-13 DIAGNOSIS — H01.011: ICD-10-CM

## 2023-04-13 DIAGNOSIS — H16.223: ICD-10-CM

## 2023-04-13 DIAGNOSIS — H01.012: ICD-10-CM

## 2023-04-13 DIAGNOSIS — H01.015: ICD-10-CM

## 2023-04-13 DIAGNOSIS — H47.233: ICD-10-CM

## 2023-04-13 DIAGNOSIS — H01.014: ICD-10-CM

## 2023-04-13 PROCEDURE — 99213 OFFICE O/P EST LOW 20 MIN: CPT | Performed by: OPHTHALMOLOGY

## 2023-04-13 PROCEDURE — 92083 EXTENDED VISUAL FIELD XM: CPT | Performed by: OPHTHALMOLOGY

## 2023-04-13 PROCEDURE — 92133 CPTRZD OPH DX IMG PST SGM ON: CPT | Performed by: OPHTHALMOLOGY

## 2023-04-13 ASSESSMENT — REFRACTION_CURRENTRX
OD_VPRISM_DIRECTION: SV
OD_OVR_VA: 20/
OD_AXIS: 087
OS_SPHERE: +2.25
OS_OVR_VA: 20/
OD_OVR_VA: 20/
OS_AXIS: 103
OD_CYLINDER: -1.00
OS_VPRISM_DIRECTION: SV
OD_VPRISM_DIRECTION: SV
OD_AXIS: 077
OS_CYLINDER: -0.75
OS_VPRISM_DIRECTION: SV
OD_CYLINDER: -1.00
OD_OVR_VA: 20/
OD_CYLINDER: -1.00
OD_AXIS: 088
OS_AXIS: 070
OS_OVR_VA: 20/
OD_SPHERE: +4.50
OS_SPHERE: +4.50
OD_SPHERE: +2.25
OD_SPHERE: +5.00
OS_AXIS: 084
OS_CYLINDER: -0.50
OS_CYLINDER: -0.25
OS_SPHERE: +4.25
OS_OVR_VA: 20/

## 2023-04-13 ASSESSMENT — AXIALLENGTH_DERIVED
OS_AL: 23.9852
OD_AL: 24.1794

## 2023-04-13 ASSESSMENT — TONOMETRY
OD_IOP_MMHG: 15
OS_IOP_MMHG: 16

## 2023-04-13 ASSESSMENT — REFRACTION_MANIFEST
OD_VA1: 20/20
OD_CYLINDER: SPHERE
OD_SPHERE: -0.25

## 2023-04-13 ASSESSMENT — KERATOMETRY
METHOD_AUTO_MANUAL: AUTO
OS_K1POWER_DIOPTERS: 43.25
OS_K2POWER_DIOPTERS: 43.50
OD_K2POWER_DIOPTERS: 43.25
OD_AXISANGLE_DEGREES: 134
OS_AXISANGLE_DEGREES: 117
OD_K1POWER_DIOPTERS: 42.75

## 2023-04-13 ASSESSMENT — VISUAL ACUITY
OD_BCVA: 20/25+1
OS_BCVA: 20/25+1

## 2023-04-13 ASSESSMENT — SPHEQUIV_DERIVED
OD_SPHEQUIV: -1
OS_SPHEQUIV: -0.875

## 2023-04-13 ASSESSMENT — SUPERFICIAL PUNCTATE KERATITIS (SPK)
OD_SPK: T
OS_SPK: T

## 2023-04-13 ASSESSMENT — CONFRONTATIONAL VISUAL FIELD TEST (CVF)
OD_FINDINGS: FULL
OS_FINDINGS: FULL

## 2023-04-13 ASSESSMENT — REFRACTION_AUTOREFRACTION
OD_CYLINDER: -0.50
OS_CYLINDER: -0.75
OD_AXIS: 098
OD_SPHERE: -0.75
OS_AXIS: 064
OS_SPHERE: -0.50

## 2023-04-13 ASSESSMENT — LID EXAM ASSESSMENTS
OD_BLEPHARITIS: RUL 1+
OS_BLEPHARITIS: LUL 1+

## 2023-06-29 ENCOUNTER — APPOINTMENT (OUTPATIENT)
Dept: OBGYN | Facility: CLINIC | Age: 83
End: 2023-06-29

## 2023-06-30 DIAGNOSIS — Z12.39 ENCOUNTER FOR OTHER SCREENING FOR MALIGNANT NEOPLASM OF BREAST: ICD-10-CM

## 2023-06-30 DIAGNOSIS — R92.2 INCONCLUSIVE MAMMOGRAM: ICD-10-CM

## 2024-01-30 ENCOUNTER — OFFICE (OUTPATIENT)
Dept: URBAN - METROPOLITAN AREA CLINIC 112 | Facility: CLINIC | Age: 84
Setting detail: OPHTHALMOLOGY
End: 2024-01-30
Payer: MEDICARE

## 2024-01-30 DIAGNOSIS — H35.40: ICD-10-CM

## 2024-01-30 DIAGNOSIS — H16.223: ICD-10-CM

## 2024-01-30 DIAGNOSIS — H16.221: ICD-10-CM

## 2024-01-30 DIAGNOSIS — H47.233: ICD-10-CM

## 2024-01-30 DIAGNOSIS — H01.015: ICD-10-CM

## 2024-01-30 DIAGNOSIS — H01.012: ICD-10-CM

## 2024-01-30 DIAGNOSIS — H01.014: ICD-10-CM

## 2024-01-30 DIAGNOSIS — H01.011: ICD-10-CM

## 2024-01-30 DIAGNOSIS — H16.222: ICD-10-CM

## 2024-01-30 PROCEDURE — 92250 FUNDUS PHOTOGRAPHY W/I&R: CPT | Performed by: OPHTHALMOLOGY

## 2024-01-30 PROCEDURE — 83861 MICROFLUID ANALY TEARS: CPT | Mod: QW,LT | Performed by: OPHTHALMOLOGY

## 2024-01-30 PROCEDURE — 83861 MICROFLUID ANALY TEARS: CPT | Mod: QW,RT | Performed by: OPHTHALMOLOGY

## 2024-01-30 PROCEDURE — 92014 COMPRE OPH EXAM EST PT 1/>: CPT | Performed by: OPHTHALMOLOGY

## 2024-01-30 ASSESSMENT — LID EXAM ASSESSMENTS
OD_BLEPHARITIS: RUL 1+
OS_BLEPHARITIS: LUL 1+

## 2024-01-30 ASSESSMENT — REFRACTION_AUTOREFRACTION
OD_AXIS: 108
OD_SPHERE: -0.50
OS_SPHERE: -0.50
OD_CYLINDER: -0.50
OS_CYLINDER: -1.00
OS_AXIS: 054

## 2024-01-30 ASSESSMENT — SUPERFICIAL PUNCTATE KERATITIS (SPK)
OD_SPK: T
OS_SPK: T

## 2024-01-30 ASSESSMENT — REFRACTION_CURRENTRX
OD_SPHERE: +2.25
OD_VPRISM_DIRECTION: SV
OS_SPHERE: +4.50
OD_VPRISM_DIRECTION: SV
OD_SPHERE: +5.00
OS_AXIS: 084
OD_CYLINDER: -1.00
OD_AXIS: 087
OS_AXIS: 103
OS_VPRISM_DIRECTION: SV
OD_OVR_VA: 20/
OD_OVR_VA: 20/
OS_VPRISM_DIRECTION: SV
OD_CYLINDER: -1.00
OD_AXIS: 077
OS_CYLINDER: -0.50
OS_OVR_VA: 20/
OS_SPHERE: +4.25
OS_CYLINDER: -0.75
OD_SPHERE: +4.50
OD_CYLINDER: -1.00
OS_CYLINDER: -0.25
OS_AXIS: 070
OD_AXIS: 088
OS_SPHERE: +2.25
OS_OVR_VA: 20/
OD_OVR_VA: 20/
OS_OVR_VA: 20/

## 2024-01-30 ASSESSMENT — REFRACTION_MANIFEST
OD_SPHERE: -0.25
OD_VA1: 20/20
OD_CYLINDER: SPHERE

## 2024-01-30 ASSESSMENT — CONFRONTATIONAL VISUAL FIELD TEST (CVF)
OD_FINDINGS: FULL
OS_FINDINGS: FULL

## 2024-01-30 ASSESSMENT — SPHEQUIV_DERIVED
OS_SPHEQUIV: -1
OD_SPHEQUIV: -0.75

## 2024-06-11 ENCOUNTER — APPOINTMENT (OUTPATIENT)
Dept: OBGYN | Facility: CLINIC | Age: 84
End: 2024-06-11

## 2024-08-14 NOTE — PROGRESS NOTE ADULT - PROVIDER SPECIALTY LIST ADULT
Anesthesia Vomiting R11.10    Vomiting with headache.  Ibuprofen at onset.  Zofran if still vomiting with headache.          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   Ibuprofen 200 mg (2 teaspoons) at onset of headache.   If not helping, add Ondansetron (Zofran) - 4 mg dissolvable tablet at onset of headache.  Please call if worsens or not improving with above interventions.

## 2024-08-20 ENCOUNTER — OFFICE (OUTPATIENT)
Dept: URBAN - METROPOLITAN AREA CLINIC 112 | Facility: CLINIC | Age: 84
Setting detail: OPHTHALMOLOGY
End: 2024-08-20
Payer: MEDICARE

## 2024-08-20 DIAGNOSIS — H01.015: ICD-10-CM

## 2024-08-20 DIAGNOSIS — H47.233: ICD-10-CM

## 2024-08-20 DIAGNOSIS — H16.222: ICD-10-CM

## 2024-08-20 DIAGNOSIS — H16.221: ICD-10-CM

## 2024-08-20 DIAGNOSIS — H35.40: ICD-10-CM

## 2024-08-20 DIAGNOSIS — H01.012: ICD-10-CM

## 2024-08-20 DIAGNOSIS — H01.014: ICD-10-CM

## 2024-08-20 DIAGNOSIS — H16.223: ICD-10-CM

## 2024-08-20 DIAGNOSIS — H01.011: ICD-10-CM

## 2024-08-20 DIAGNOSIS — H52.4: ICD-10-CM

## 2024-08-20 PROCEDURE — 92015 DETERMINE REFRACTIVE STATE: CPT | Performed by: OPHTHALMOLOGY

## 2024-08-20 PROCEDURE — 92133 CPTRZD OPH DX IMG PST SGM ON: CPT | Performed by: OPHTHALMOLOGY

## 2024-08-20 PROCEDURE — 99213 OFFICE O/P EST LOW 20 MIN: CPT | Performed by: OPHTHALMOLOGY

## 2024-08-20 PROCEDURE — 92083 EXTENDED VISUAL FIELD XM: CPT | Performed by: OPHTHALMOLOGY

## 2024-08-20 ASSESSMENT — CONFRONTATIONAL VISUAL FIELD TEST (CVF)
OD_FINDINGS: FULL
OS_FINDINGS: FULL

## 2024-08-20 ASSESSMENT — LID EXAM ASSESSMENTS
OD_BLEPHARITIS: RUL 1+
OS_BLEPHARITIS: LUL 1+

## 2024-09-18 DIAGNOSIS — Z12.31 ENCOUNTER FOR SCREENING MAMMOGRAM FOR MALIGNANT NEOPLASM OF BREAST: ICD-10-CM

## 2024-12-03 NOTE — BRIEF OPERATIVE NOTE - BRIEF OP NOTE DRAINS
I connected with the patient who reports a productive cough over the past 4 weeks, rhinorrhea, nasal congestion, intermittent wheezing, and fatigue.  She notes recently she began feeling shortness of breath throughout the day even at rest.  She denies having any fevers.  She admits to having some chest tightness/pressure.  I recommended that based upon the duration of her symptoms she be evaluated in person for an accurate diagnosis and treatment plan.  She was advised to go to urgent care today.  She is in agreement with the plan.  She was instructed this video visit would be disqualified and there would be no charge.   hemovac

## 2024-12-05 NOTE — HISTORY OF PRESENT ILLNESS
Transitional Care Management Telephone Call    Today's Date: 12/5/2024      Discharge Date: 12/1/24    Discharged From: [facility]    Items for attention: Patient home with family care.  TCM visit set with PCP.  Patient denies any concern of requiring ED visit anytime soon. Provided access to care and welcome letter via portal. Patient educated on importance of monitoring vital signs, fall precautions, and medication compliance.  Agreeable to weekly follow up call`    Care Transitions Assessment    Utilization:  Visit Hospital Last 30 Days: Unplanned inpatient admission   Perception of Change in Health Status D/C: Improving  Discharged To: Home independently  Discharge Instructions: Received discharge instructions  Transition of Care Information Provided: Providers notified of CLAUDE  Phone Call to Facility to Check Status:      Medications:  Prescriptions: Augmentin  IP/Amb Med List Reviewed with Patient: Yes  Med Prescriptions Filled After D/C: Confirms taking as prescribed  Questions About Meds Prescribed at D/C: Denies questions    Follow-up Care:  Appointments:    Provider Appt Scheduled Prior to D/C: Yes  Provider Appt Date: 12/01/24     Type of Provider: Dr. Demetrio Huang     Appt Scheduled Prior to D/C: Yes  Specialist Appt Date:    Type of Specialist: GI    Follow-up Appt Status: Confirms scheduled appt    Skilled Services: No      Equipment/DME:   DME Type: Oxygen; Shower Chair; BP Monitor; Pulse Oximeter  Oxygen Vendor:    Oxygen Comments: PRN  Patients reported confidence in appropriate use of DME? Confident    Review of Systems:   Care Transition System Evaluation:    BP- Patient Reported:    BP- Patient Reported (Automatic):     Pulse - Patient Reported :     Temp - Patient Reported :    Fever: is not present   Pain:     Pain Location:    Weight-Patient Reported :    Resp - Patient Reported :    SpO2% - Patient Reported :94 %  [place] - Patient Reported :    General Symptoms:  Fatigue  Neurologic/Neuromuscular Symptoms: Weakness  ENT Symptoms: WDL (absence of symptoms)  Respiratory Symptoms: WDL (absence of symptoms)     Cardiovascular Symptoms: WDL (absence of symptoms)  GI Symptoms: WDL (absence of symptoms)         Symptoms: WDL (absence of symptoms)  Skin Symptoms: WDL (absence of symptoms)     Hours of Uninterrupted Sleep:   Sleeping Behaviors:Middle of night awakening  Current Lines/Drains:No    Activities of Daily Living (global): Self-care    Patient states that she does have sufficient family support. She feels that she is able to ask for assistance when needed.       [FreeTextEntry1] : (Initial HPI 6/16/20 \par Previous pt of Dr. Morel's carried dx PsA and OA\par 81 yo wf with long hx of joint pain OA (b/l TKR) vs. PsA (+ FH mother severe psoriasis) with intermittent episodes of GTB b/l (given injections in past, severe last wk but better today.. ), and L knee pain s/p replacement but recurrent warmth/ swelling and pain around knee.  No overt effusions. \par + Psoriasis ys ago behind ears confirmed by Dr. Irwin, nothing recent. 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 denies fevers, chills, night sweats, lymphadenopathy, , rash, alopecia, raynauds, headaches, vision loss, sicca, mucositis, serositis, cp, sob, cough, GERD, n/v/d/c or abd bloating, bleeding/ clotting dyscrasias or cytopenias, paresthesias or weakness, renal or hepatic dysfunction. \par \par - Bone density:  known OP in past, last study 2 ys ago.. nl \par Fosamax in past.\par \par

## 2025-01-02 ENCOUNTER — APPOINTMENT (OUTPATIENT)
Dept: OBGYN | Facility: CLINIC | Age: 85
End: 2025-01-02

## 2025-01-21 ENCOUNTER — APPOINTMENT (OUTPATIENT)
Dept: OBGYN | Facility: CLINIC | Age: 85
End: 2025-01-21

## 2025-01-27 ENCOUNTER — APPOINTMENT (OUTPATIENT)
Facility: CLINIC | Age: 85
End: 2025-01-27
Payer: MEDICARE

## 2025-01-27 VITALS — WEIGHT: 145 LBS | HEIGHT: 60 IN | BODY MASS INDEX: 28.47 KG/M2

## 2025-01-27 DIAGNOSIS — Z96.652 PRESENCE OF LEFT ARTIFICIAL KNEE JOINT: ICD-10-CM

## 2025-01-27 DIAGNOSIS — Z96.651 PRESENCE OF RIGHT ARTIFICIAL KNEE JOINT: ICD-10-CM

## 2025-01-27 DIAGNOSIS — M17.0 BILATERAL PRIMARY OSTEOARTHRITIS OF KNEE: ICD-10-CM

## 2025-01-27 PROCEDURE — 99203 OFFICE O/P NEW LOW 30 MIN: CPT

## 2025-01-27 PROCEDURE — 73564 X-RAY EXAM KNEE 4 OR MORE: CPT | Mod: 50

## 2025-04-01 ENCOUNTER — NON-APPOINTMENT (OUTPATIENT)
Age: 85
End: 2025-04-01

## 2025-04-01 ENCOUNTER — APPOINTMENT (OUTPATIENT)
Dept: OBGYN | Facility: CLINIC | Age: 85
End: 2025-04-01
Payer: MEDICARE

## 2025-04-01 VITALS
HEIGHT: 60 IN | SYSTOLIC BLOOD PRESSURE: 110 MMHG | BODY MASS INDEX: 31.02 KG/M2 | DIASTOLIC BLOOD PRESSURE: 75 MMHG | WEIGHT: 158 LBS

## 2025-04-01 DIAGNOSIS — R92.30 DENSE BREASTS, UNSPECIFIED: ICD-10-CM

## 2025-04-01 DIAGNOSIS — Z13.31 ENCOUNTER FOR SCREENING FOR DEPRESSION: ICD-10-CM

## 2025-04-01 DIAGNOSIS — M81.0 AGE-RELATED OSTEOPOROSIS W/OUT CURRENT PATHOLOGICAL FRACTURE: ICD-10-CM

## 2025-04-01 DIAGNOSIS — Z46.89 ENCOUNTER FOR FITTING AND ADJUSTMENT OF OTHER SPECIFIED DEVICES: ICD-10-CM

## 2025-04-01 DIAGNOSIS — N36.2 URETHRAL CARUNCLE: ICD-10-CM

## 2025-04-01 DIAGNOSIS — D21.9 BENIGN NEOPLASM OF CONNECTIVE AND OTHER SOFT TISSUE, UNSPECIFIED: ICD-10-CM

## 2025-04-01 DIAGNOSIS — Z01.411 ENCOUNTER FOR GYNECOLOGICAL EXAMINATION (GENERAL) (ROUTINE) WITH ABNORMAL FINDINGS: ICD-10-CM

## 2025-04-01 DIAGNOSIS — N90.5 ATROPHY OF VULVA: ICD-10-CM

## 2025-04-01 DIAGNOSIS — N81.9 FEMALE GENITAL PROLAPSE, UNSPECIFIED: ICD-10-CM

## 2025-04-01 PROCEDURE — G0328 FECAL BLOOD SCRN IMMUNOASSAY: CPT | Mod: QW

## 2025-04-01 PROCEDURE — G0444 DEPRESSION SCREEN ANNUAL: CPT | Mod: 59

## 2025-04-01 PROCEDURE — G0101: CPT

## 2025-07-10 ENCOUNTER — OFFICE (OUTPATIENT)
Dept: URBAN - METROPOLITAN AREA CLINIC 112 | Facility: CLINIC | Age: 85
Setting detail: OPHTHALMOLOGY
End: 2025-07-10
Payer: MEDICARE

## 2025-07-10 DIAGNOSIS — H01.014: ICD-10-CM

## 2025-07-10 DIAGNOSIS — H16.223: ICD-10-CM

## 2025-07-10 DIAGNOSIS — H47.233: ICD-10-CM

## 2025-07-10 DIAGNOSIS — H01.015: ICD-10-CM

## 2025-07-10 DIAGNOSIS — H01.011: ICD-10-CM

## 2025-07-10 DIAGNOSIS — H35.40: ICD-10-CM

## 2025-07-10 DIAGNOSIS — H01.012: ICD-10-CM

## 2025-07-10 PROCEDURE — 99214 OFFICE O/P EST MOD 30 MIN: CPT | Performed by: OPHTHALMOLOGY

## 2025-07-10 PROCEDURE — 92250 FUNDUS PHOTOGRAPHY W/I&R: CPT | Performed by: OPHTHALMOLOGY

## 2025-07-10 ASSESSMENT — REFRACTION_CURRENTRX
OS_CYLINDER: -0.75
OD_OVR_VA: 20/
OD_OVR_VA: 20/
OS_OVR_VA: 20/
OS_SPHERE: +2.25
OS_CYLINDER: -0.25
OS_VPRISM_DIRECTION: SV
OD_SPHERE: +5.00
OS_SPHERE: +4.25
OD_CYLINDER: -1.00
OS_AXIS: 103
OD_AXIS: 077
OS_AXIS: 070
OS_OVR_VA: 20/
OS_VPRISM_DIRECTION: SV
OD_OVR_VA: 20/
OD_VPRISM_DIRECTION: SV
OD_AXIS: 087
OD_CYLINDER: -1.00
OD_SPHERE: +2.25
OD_AXIS: 088
OD_CYLINDER: -1.00
OS_SPHERE: +4.50
OD_VPRISM_DIRECTION: SV
OS_CYLINDER: -0.50
OS_AXIS: 084
OD_SPHERE: +4.50
OS_OVR_VA: 20/

## 2025-07-10 ASSESSMENT — REFRACTION_MANIFEST
OD_ADD: +2.75
OS_VA2: 20/20
OD_SPHERE: -0.25
OU_VA: 20/20
OD_VA1: 20/20
OS_VA1: 20/20
OD_CYLINDER: -0.25
OS_AXIS: 074
OS_CYLINDER: -0.25
OD_SPHERE: PLANO
OD_VA1: 20/20
OD_AXIS: 101
OS_SPHERE: PLANO
OD_VA2: 20/20
OD_CYLINDER: SPHERE
OS_ADD: +2.75

## 2025-07-10 ASSESSMENT — VISUAL ACUITY
OS_BCVA: 20/20-1
OD_BCVA: 20/20

## 2025-07-10 ASSESSMENT — REFRACTION_AUTOREFRACTION
OD_SPHERE: 0.00
OS_CYLINDER: -1.00
OD_AXIS: 102
OS_SPHERE: +0.25
OS_AXIS: 075
OD_CYLINDER: -1.25

## 2025-07-10 ASSESSMENT — KERATOMETRY
OS_K1POWER_DIOPTERS: 42.50
OD_AXISANGLE_DEGREES: 007
OD_K1POWER_DIOPTERS: 42.25
OD_K2POWER_DIOPTERS: 43.25
OS_K2POWER_DIOPTERS: 43.50
OS_AXISANGLE_DEGREES: 169
METHOD_AUTO_MANUAL: AUTO

## 2025-07-10 ASSESSMENT — CONFRONTATIONAL VISUAL FIELD TEST (CVF)
OD_FINDINGS: FULL
OS_FINDINGS: FULL

## 2025-07-10 ASSESSMENT — LID EXAM ASSESSMENTS
OD_BLEPHARITIS: RUL 1+
OS_BLEPHARITIS: LUL 1+

## 2025-07-10 ASSESSMENT — SUPERFICIAL PUNCTATE KERATITIS (SPK)
OD_SPK: T
OS_SPK: T

## 2025-07-10 ASSESSMENT — TONOMETRY
OD_IOP_MMHG: 13
OS_IOP_MMHG: 15

## 2025-07-16 ENCOUNTER — APPOINTMENT (OUTPATIENT)
Dept: OBGYN | Facility: CLINIC | Age: 85
End: 2025-07-16

## 2025-08-22 ENCOUNTER — OFFICE (OUTPATIENT)
Dept: URBAN - METROPOLITAN AREA CLINIC 112 | Facility: CLINIC | Age: 85
Setting detail: OPHTHALMOLOGY
End: 2025-08-22
Payer: MEDICARE

## 2025-08-22 DIAGNOSIS — H47.233: ICD-10-CM

## 2025-08-22 PROCEDURE — 92083 EXTENDED VISUAL FIELD XM: CPT | Performed by: OPHTHALMOLOGY

## 2025-08-22 PROCEDURE — 92133 CPTRZD OPH DX IMG PST SGM ON: CPT | Performed by: OPHTHALMOLOGY

## 2025-08-22 ASSESSMENT — REFRACTION_CURRENTRX
OS_OVR_VA: 20/
OS_AXIS: 084
OD_OVR_VA: 20/
OD_SPHERE: +2.25
OS_CYLINDER: -0.25
OD_AXIS: 088
OD_AXIS: 077
OD_CYLINDER: -1.00
OS_CYLINDER: -0.75
OD_CYLINDER: -1.00
OS_SPHERE: +4.50
OS_SPHERE: +2.25
OD_OVR_VA: 20/
OD_OVR_VA: 20/
OS_AXIS: 103
OD_VPRISM_DIRECTION: SV
OD_CYLINDER: -1.00
OS_OVR_VA: 20/
OS_SPHERE: +4.25
OS_VPRISM_DIRECTION: SV
OD_VPRISM_DIRECTION: SV
OS_CYLINDER: -0.50
OD_AXIS: 087
OS_VPRISM_DIRECTION: SV
OS_AXIS: 070
OD_SPHERE: +5.00
OD_SPHERE: +4.50
OS_OVR_VA: 20/

## 2025-08-22 ASSESSMENT — KERATOMETRY
METHOD_AUTO_MANUAL: AUTO
OS_K2POWER_DIOPTERS: 43.50
OS_AXISANGLE_DEGREES: 169
OD_K2POWER_DIOPTERS: 43.25
OD_AXISANGLE_DEGREES: 007
OS_K1POWER_DIOPTERS: 42.50
OD_K1POWER_DIOPTERS: 42.25

## 2025-08-22 ASSESSMENT — REFRACTION_MANIFEST
OS_SPHERE: PLANO
OD_CYLINDER: SPHERE
OS_CYLINDER: -0.25
OS_VA1: 20/20
OD_AXIS: 101
OD_SPHERE: -0.25
OD_VA1: 20/20
OU_VA: 20/20
OD_VA1: 20/20
OS_VA2: 20/20
OD_ADD: +2.75
OD_VA2: 20/20
OD_CYLINDER: -0.25
OD_SPHERE: PLANO
OS_ADD: +2.75
OS_AXIS: 074

## 2025-08-22 ASSESSMENT — REFRACTION_AUTOREFRACTION
OD_SPHERE: 0.00
OS_AXIS: 075
OD_AXIS: 102
OD_CYLINDER: -1.25
OS_SPHERE: +0.25
OS_CYLINDER: -1.00

## 2025-08-22 ASSESSMENT — VISUAL ACUITY
OS_BCVA: 20/20-1
OD_BCVA: 20/20

## 2025-08-27 ENCOUNTER — OFFICE (OUTPATIENT)
Dept: URBAN - METROPOLITAN AREA CLINIC 112 | Facility: CLINIC | Age: 85
Setting detail: OPHTHALMOLOGY
End: 2025-08-27
Payer: MEDICARE

## 2025-08-27 DIAGNOSIS — H16.223: ICD-10-CM

## 2025-08-27 DIAGNOSIS — H35.40: ICD-10-CM

## 2025-08-27 DIAGNOSIS — H01.014: ICD-10-CM

## 2025-08-27 DIAGNOSIS — H01.015: ICD-10-CM

## 2025-08-27 DIAGNOSIS — H01.012: ICD-10-CM

## 2025-08-27 DIAGNOSIS — H47.233: ICD-10-CM

## 2025-08-27 DIAGNOSIS — H01.011: ICD-10-CM

## 2025-08-27 PROCEDURE — 99213 OFFICE O/P EST LOW 20 MIN: CPT | Performed by: OPHTHALMOLOGY

## 2025-08-27 ASSESSMENT — REFRACTION_CURRENTRX
OD_OVR_VA: 20/
OD_AXIS: 087
OS_CYLINDER: -0.50
OD_OVR_VA: 20/
OS_VPRISM_DIRECTION: SV
OS_CYLINDER: -0.75
OS_AXIS: 070
OS_SPHERE: +4.50
OS_AXIS: 103
OD_OVR_VA: 20/
OD_SPHERE: +4.50
OD_VPRISM_DIRECTION: SV
OD_CYLINDER: -1.00
OS_OVR_VA: 20/
OD_SPHERE: +5.00
OD_CYLINDER: -1.00
OS_VPRISM_DIRECTION: SV
OS_CYLINDER: -0.25
OD_AXIS: 088
OD_VPRISM_DIRECTION: SV
OS_OVR_VA: 20/
OS_AXIS: 084
OS_OVR_VA: 20/
OD_SPHERE: +2.25
OD_AXIS: 077
OD_CYLINDER: -1.00
OS_SPHERE: +4.25
OS_SPHERE: +2.25

## 2025-08-27 ASSESSMENT — REFRACTION_MANIFEST
OD_VA1: 20/20
OD_VA1: 20/20
OD_CYLINDER: SPHERE
OD_AXIS: 101
OD_CYLINDER: -0.25
OS_ADD: +2.75
OS_AXIS: 074
OU_VA: 20/20
OD_ADD: +2.75
OS_SPHERE: PLANO
OS_VA1: 20/20
OD_VA2: 20/20
OD_SPHERE: PLANO
OS_CYLINDER: -0.25
OS_VA2: 20/20
OD_SPHERE: -0.25

## 2025-08-27 ASSESSMENT — REFRACTION_AUTOREFRACTION
OD_AXIS: 102
OS_SPHERE: +0.25
OD_SPHERE: 0.00
OS_AXIS: 075
OS_CYLINDER: -1.00
OD_CYLINDER: -1.25

## 2025-08-27 ASSESSMENT — VISUAL ACUITY
OD_BCVA: 20/40-1
OS_BCVA: 20/60-1

## 2025-08-27 ASSESSMENT — CONFRONTATIONAL VISUAL FIELD TEST (CVF)
OD_FINDINGS: FULL
OS_FINDINGS: FULL

## 2025-08-27 ASSESSMENT — LID EXAM ASSESSMENTS
OD_BLEPHARITIS: RUL 1+
OS_BLEPHARITIS: LUL 1+

## 2025-08-27 ASSESSMENT — KERATOMETRY
OS_K1POWER_DIOPTERS: 42.50
OS_AXISANGLE_DEGREES: 169
OS_K2POWER_DIOPTERS: 43.50
OD_AXISANGLE_DEGREES: 007
OD_K2POWER_DIOPTERS: 43.25
METHOD_AUTO_MANUAL: AUTO
OD_K1POWER_DIOPTERS: 42.25

## 2025-08-27 ASSESSMENT — TONOMETRY
OS_IOP_MMHG: 15
OD_IOP_MMHG: 13

## 2025-08-27 ASSESSMENT — SUPERFICIAL PUNCTATE KERATITIS (SPK)
OD_SPK: T
OS_SPK: T